# Patient Record
Sex: MALE | Race: ASIAN | NOT HISPANIC OR LATINO | URBAN - METROPOLITAN AREA
[De-identification: names, ages, dates, MRNs, and addresses within clinical notes are randomized per-mention and may not be internally consistent; named-entity substitution may affect disease eponyms.]

---

## 2023-04-26 LAB
ALANINE AMINOTRANSFERASE (SGPT) (U/L) IN SER/PLAS: 27 U/L (ref 10–52)
ALBUMIN (G/DL) IN SER/PLAS: 2.8 G/DL (ref 3.4–5)
ALKALINE PHOSPHATASE (U/L) IN SER/PLAS: 79 U/L (ref 33–136)
ANION GAP IN SER/PLAS: 10 MMOL/L (ref 10–20)
ASPARTATE AMINOTRANSFERASE (SGOT) (U/L) IN SER/PLAS: 32 U/L (ref 9–39)
BILIRUBIN TOTAL (MG/DL) IN SER/PLAS: 0.5 MG/DL (ref 0–1.2)
CALCIUM (MG/DL) IN SER/PLAS: 8.4 MG/DL (ref 8.6–10.6)
CARBON DIOXIDE, TOTAL (MMOL/L) IN SER/PLAS: 26 MMOL/L (ref 21–32)
CHLORIDE (MMOL/L) IN SER/PLAS: 106 MMOL/L (ref 98–107)
CHOLESTEROL (MG/DL) IN SER/PLAS: 220 MG/DL (ref 0–199)
CHOLESTEROL IN HDL (MG/DL) IN SER/PLAS: 64.6 MG/DL
CHOLESTEROL/HDL RATIO: 3.4
CREATININE (MG/DL) IN SER/PLAS: 0.97 MG/DL (ref 0.5–1.3)
ESTIMATED AVERAGE GLUCOSE FOR HBA1C: 146 MG/DL
GFR MALE: 83 ML/MIN/1.73M2
GLUCOSE (MG/DL) IN SER/PLAS: 98 MG/DL (ref 74–99)
HEMOGLOBIN A1C/HEMOGLOBIN TOTAL IN BLOOD: 6.7 %
LDL: 134 MG/DL (ref 0–99)
POTASSIUM (MMOL/L) IN SER/PLAS: 4.2 MMOL/L (ref 3.5–5.3)
PROTEIN TOTAL: 5.4 G/DL (ref 6.4–8.2)
SODIUM (MMOL/L) IN SER/PLAS: 138 MMOL/L (ref 136–145)
TRIGLYCERIDE (MG/DL) IN SER/PLAS: 106 MG/DL (ref 0–149)
UREA NITROGEN (MG/DL) IN SER/PLAS: 16 MG/DL (ref 6–23)
VLDL: 21 MG/DL (ref 0–40)

## 2023-10-04 ENCOUNTER — ANCILLARY PROCEDURE (OUTPATIENT)
Dept: RADIOLOGY | Facility: CLINIC | Age: 73
End: 2023-10-04
Payer: COMMERCIAL

## 2023-10-04 ENCOUNTER — HOSPITAL ENCOUNTER (OUTPATIENT)
Dept: CARDIOLOGY | Facility: CLINIC | Age: 73
Discharge: HOME | End: 2023-10-04
Payer: MEDICARE

## 2023-10-04 ENCOUNTER — ANCILLARY PROCEDURE (OUTPATIENT)
Dept: RADIOLOGY | Facility: CLINIC | Age: 73
End: 2023-10-04
Payer: MEDICARE

## 2023-10-04 DIAGNOSIS — R07.89 OTHER CHEST PAIN: ICD-10-CM

## 2023-10-04 DIAGNOSIS — I25.10 ATHEROSCLEROTIC HEART DISEASE OF NATIVE CORONARY ARTERY WITHOUT ANGINA PECTORIS: Primary | ICD-10-CM

## 2023-10-04 DIAGNOSIS — I25.10 ATHEROSCLEROTIC HEART DISEASE OF NATIVE CORONARY ARTERY WITHOUT ANGINA PECTORIS: ICD-10-CM

## 2023-10-04 DIAGNOSIS — I25.10 ATHEROSCLEROTIC HEART DISEASE: Primary | ICD-10-CM

## 2023-10-04 PROCEDURE — 93016 CV STRESS TEST SUPVJ ONLY: CPT | Performed by: INTERNAL MEDICINE

## 2023-10-04 PROCEDURE — 93017 CV STRESS TEST TRACING ONLY: CPT

## 2023-10-04 PROCEDURE — 78452 HT MUSCLE IMAGE SPECT MULT: CPT | Performed by: INTERNAL MEDICINE

## 2023-10-04 PROCEDURE — 78451 HT MUSCLE IMAGE SPECT SING: CPT

## 2023-10-17 PROBLEM — R76.8 ANA POSITIVE: Status: ACTIVE | Noted: 2023-10-17

## 2023-10-17 PROBLEM — R09.81 CHRONIC NASAL CONGESTION: Status: ACTIVE | Noted: 2023-10-17

## 2023-10-17 PROBLEM — E29.1 HYPOGONADISM MALE: Status: ACTIVE | Noted: 2023-10-17

## 2023-10-17 PROBLEM — I70.90 ARTERIOLOSCLEROSIS: Status: ACTIVE | Noted: 2023-10-17

## 2023-10-17 PROBLEM — E03.8 SUBCLINICAL HYPOTHYROIDISM: Status: ACTIVE | Noted: 2023-10-17

## 2023-10-17 PROBLEM — I73.00 RAYNAUD'S PHENOMENON: Status: ACTIVE | Noted: 2023-10-17

## 2023-10-17 PROBLEM — R39.9 LOWER URINARY TRACT SYMPTOMS (LUTS): Status: ACTIVE | Noted: 2023-10-17

## 2023-10-17 PROBLEM — N52.9 ERECTILE DYSFUNCTION: Status: ACTIVE | Noted: 2023-10-17

## 2023-10-17 PROBLEM — L98.9 SKIN LESION: Status: ACTIVE | Noted: 2023-10-17

## 2023-10-17 PROBLEM — E55.9 VITAMIN D DEFICIENCY: Status: ACTIVE | Noted: 2023-10-17

## 2023-10-17 PROBLEM — K21.9 GERD (GASTROESOPHAGEAL REFLUX DISEASE): Status: ACTIVE | Noted: 2023-10-17

## 2023-10-17 PROBLEM — R22.31 NODULE OF FINGER OF RIGHT HAND: Status: ACTIVE | Noted: 2023-10-17

## 2023-10-17 PROBLEM — E23.7 PITUITARY DISORDER (MULTI): Status: ACTIVE | Noted: 2018-04-18

## 2023-10-17 PROBLEM — R93.1 AGATSTON CAC SCORE, >400: Status: ACTIVE | Noted: 2023-10-17

## 2023-10-17 PROBLEM — I10 HYPERTENSION: Status: ACTIVE | Noted: 2018-04-18

## 2023-10-17 PROBLEM — R31.9 HEMATURIA: Status: ACTIVE | Noted: 2023-10-17

## 2023-10-17 PROBLEM — E11.65 TYPE 2 DIABETES MELLITUS WITH HYPERGLYCEMIA (MULTI): Status: ACTIVE | Noted: 2018-04-18

## 2023-10-17 PROBLEM — E34.9 TESTOSTERONE DEFICIENCY: Status: ACTIVE | Noted: 2018-04-18

## 2023-10-17 PROBLEM — E78.5 HYPERLIPIDEMIA: Status: ACTIVE | Noted: 2018-04-18

## 2023-10-17 PROBLEM — R68.89 COLD INTOLERANCE: Status: ACTIVE | Noted: 2023-10-17

## 2023-10-17 PROBLEM — E11.9 DIABETES MELLITUS TYPE II, CONTROLLED (MULTI): Status: ACTIVE | Noted: 2023-10-17

## 2023-10-17 PROBLEM — E03.9 HYPOTHYROIDISM: Status: ACTIVE | Noted: 2018-04-18

## 2023-10-17 RX ORDER — NIFEDIPINE 30 MG/1
1 TABLET, FILM COATED, EXTENDED RELEASE ORAL DAILY
COMMUNITY
Start: 2020-12-23 | End: 2023-11-21 | Stop reason: WASHOUT

## 2023-10-17 RX ORDER — ROSUVASTATIN CALCIUM 40 MG/1
40 TABLET, COATED ORAL DAILY
COMMUNITY
Start: 2023-08-14 | End: 2024-05-16

## 2023-10-17 RX ORDER — CLINDAMYCIN HYDROCHLORIDE 300 MG/1
CAPSULE ORAL
COMMUNITY
Start: 2023-07-28 | End: 2023-10-19 | Stop reason: ALTCHOICE

## 2023-10-17 RX ORDER — ATORVASTATIN CALCIUM 80 MG/1
80 TABLET, FILM COATED ORAL DAILY
COMMUNITY
End: 2023-11-21 | Stop reason: WASHOUT

## 2023-10-17 RX ORDER — LOSARTAN POTASSIUM 50 MG/1
50 TABLET ORAL DAILY
COMMUNITY
Start: 2018-03-14 | End: 2023-11-21 | Stop reason: WASHOUT

## 2023-10-17 RX ORDER — METFORMIN HYDROCHLORIDE 500 MG/1
500 TABLET, EXTENDED RELEASE ORAL DAILY
COMMUNITY
End: 2024-03-14 | Stop reason: WASHOUT

## 2023-10-17 RX ORDER — LOSARTAN POTASSIUM 100 MG/1
100 TABLET ORAL DAILY
COMMUNITY
End: 2024-05-23

## 2023-10-17 RX ORDER — ATORVASTATIN CALCIUM 20 MG/1
20 TABLET, FILM COATED ORAL DAILY
COMMUNITY
Start: 2018-03-14 | End: 2023-11-21 | Stop reason: WASHOUT

## 2023-10-17 RX ORDER — EZETIMIBE 10 MG/1
10 TABLET ORAL DAILY
COMMUNITY
Start: 2023-09-06

## 2023-10-17 RX ORDER — ASPIRIN 81 MG/1
TABLET ORAL
COMMUNITY
Start: 2010-12-11

## 2023-10-17 RX ORDER — TAMSULOSIN HYDROCHLORIDE 0.4 MG/1
0.4 CAPSULE ORAL DAILY
COMMUNITY
Start: 2023-05-24 | End: 2023-11-21 | Stop reason: WASHOUT

## 2023-10-17 RX ORDER — FLUTICASONE PROPIONATE 50 MCG
1 SPRAY, SUSPENSION (ML) NASAL AS NEEDED
COMMUNITY
Start: 2022-01-04

## 2023-10-19 ENCOUNTER — OFFICE VISIT (OUTPATIENT)
Dept: DERMATOLOGY | Facility: CLINIC | Age: 73
End: 2023-10-19
Payer: COMMERCIAL

## 2023-10-19 DIAGNOSIS — L82.1 SEBORRHEIC KERATOSIS: ICD-10-CM

## 2023-10-19 DIAGNOSIS — L81.1 MELASMA: Primary | ICD-10-CM

## 2023-10-19 PROCEDURE — 4010F ACE/ARB THERAPY RXD/TAKEN: CPT | Performed by: DERMATOLOGY

## 2023-10-19 PROCEDURE — 1036F TOBACCO NON-USER: CPT | Performed by: DERMATOLOGY

## 2023-10-19 PROCEDURE — 1159F MED LIST DOCD IN RCRD: CPT | Performed by: DERMATOLOGY

## 2023-10-19 PROCEDURE — 3044F HG A1C LEVEL LT 7.0%: CPT | Performed by: DERMATOLOGY

## 2023-10-19 PROCEDURE — 1126F AMNT PAIN NOTED NONE PRSNT: CPT | Performed by: DERMATOLOGY

## 2023-10-19 PROCEDURE — 99204 OFFICE O/P NEW MOD 45 MIN: CPT | Performed by: DERMATOLOGY

## 2023-10-19 ASSESSMENT — DERMATOLOGY PATIENT ASSESSMENT
ARE YOU AN ORGAN TRANSPLANT RECIPIENT: NO
DO YOU USE A TANNING BED: NO
DO YOU HAVE ANY NEW OR CHANGING LESIONS: YES

## 2023-10-19 ASSESSMENT — DERMATOLOGY QUALITY OF LIFE (QOL) ASSESSMENT
RATE HOW EMOTIONALLY BOTHERED YOU ARE BY YOUR SKIN PROBLEM (FOR EXAMPLE, WORRY, EMBARRASSMENT, FRUSTRATION): 2
WHAT SINGLE SKIN CONDITION LISTED BELOW IS THE PATIENT ANSWERING THE QUALITY-OF-LIFE ASSESSMENT QUESTIONS ABOUT: NONE OF THE ABOVE
RATE HOW BOTHERED YOU ARE BY SYMPTOMS OF YOUR SKIN PROBLEM (EG, ITCHING, STINGING BURNING, HURTING OR SKIN IRRITATION): 2
RATE HOW BOTHERED YOU ARE BY EFFECTS OF YOUR SKIN PROBLEMS ON YOUR ACTIVITIES (EG, GOING OUT, ACCOMPLISHING WHAT YOU WANT, WORK ACTIVITIES OR YOUR RELATIONSHIPS WITH OTHERS): 0 - NEVER BOTHERED
ARE THERE EXCLUSIONS OR EXCEPTIONS FOR THE QUALITY OF LIFE ASSESSMENT: NO

## 2023-10-19 ASSESSMENT — ITCH NUMERIC RATING SCALE: HOW SEVERE IS YOUR ITCHING?: 3

## 2023-10-19 ASSESSMENT — PATIENT GLOBAL ASSESSMENT (PGA): PATIENT GLOBAL ASSESSMENT: PATIENT GLOBAL ASSESSMENT:  2 - MILD

## 2023-10-19 NOTE — PROGRESS NOTES
Subjective     Mani Larios is a 73 y.o. male who presents for the following: Suspicious Skin Lesion (Pt here today for lesion on right hand, and right cheek. Lesion on hand is enlarging and gets dry, itchy, and will crack. ).     Review of Systems:  No other skin or systemic complaints other than what is documented elsewhere in the note.    The following portions of the chart were reviewed this encounter and updated as appropriate:          Skin Cancer History  No skin cancer on file.      Specialty Problems          Dermatology Problems    Skin lesion        Objective   Well appearing patient in no apparent distress; mood and affect are within normal limits.    A focused skin examination was performed. All findings within normal limits unless otherwise noted below.    Assessment/Plan   1. Melasma  Left Buccal Cheek, Right Buccal Cheek  Hyperpigmented coalesced patches    Melasma is a common skin problem caused by brown to gray-brown patches on the face. Most people get it on their cheeks, chin, nose bridge, forehead, and above the upper lip. It is more common in women than men. Pregnancy is a common cause of melasma. It also affects woman taking oral contraceptives and hormones.    Start compounded kojic acid/hydroquinone/tretinoin 0.05% cream - pea sized amount every other day, increase to daily to as tolerated in evening. 2 months on 2 months off.    Side effects of hydroquinone reviewed including exogenous ochronosis.    Rx faxed to [facility] and patient given contact information.    Enouraged use of tinted sunscreen as studies show even light from screens can worsen or induce pigmentation. Samples of cerave tinted sunscreen and la roche posay tinted sunscreen provided.    2. Seborrheic keratosis  Right Dorsal Hand  Dark brown lichenified stuck on plaque    The benign nature of these skin lesions reviewed, reassure provided and no further treatment needed at this time.   These lesions can be removed,  if symptomatic (itching, bleeding, rubbing on clothing, painful), otherwise removal is considered cosmetic.       Follow up in 1 year or sooner if needed.

## 2023-11-15 PROBLEM — I25.10 CAD (CORONARY ARTERY DISEASE): Chronic | Status: ACTIVE | Noted: 2023-10-17

## 2023-11-16 ENCOUNTER — LAB (OUTPATIENT)
Dept: LAB | Facility: LAB | Age: 73
End: 2023-11-16
Payer: MEDICARE

## 2023-11-16 DIAGNOSIS — I25.10 ATHEROSCLEROTIC HEART DISEASE OF NATIVE CORONARY ARTERY WITHOUT ANGINA PECTORIS: Primary | ICD-10-CM

## 2023-11-16 DIAGNOSIS — R07.89 OTHER CHEST PAIN: ICD-10-CM

## 2023-11-16 LAB
ALBUMIN SERPL BCP-MCNC: 3 G/DL (ref 3.4–5)
ALP SERPL-CCNC: 64 U/L (ref 33–136)
ALT SERPL W P-5'-P-CCNC: 34 U/L (ref 10–52)
ANION GAP SERPL CALC-SCNC: 8 MMOL/L (ref 10–20)
AST SERPL W P-5'-P-CCNC: 32 U/L (ref 9–39)
BILIRUB SERPL-MCNC: 0.5 MG/DL (ref 0–1.2)
BUN SERPL-MCNC: 18 MG/DL (ref 6–23)
CALCIUM SERPL-MCNC: 8.7 MG/DL (ref 8.6–10.3)
CHLORIDE SERPL-SCNC: 106 MMOL/L (ref 98–107)
CHOLEST SERPL-MCNC: 157 MG/DL (ref 0–199)
CHOLESTEROL/HDL RATIO: 2.4
CO2 SERPL-SCNC: 29 MMOL/L (ref 21–32)
CREAT SERPL-MCNC: 0.99 MG/DL (ref 0.5–1.3)
GFR SERPL CREATININE-BSD FRML MDRD: 81 ML/MIN/1.73M*2
GLUCOSE SERPL-MCNC: 102 MG/DL (ref 74–99)
HDLC SERPL-MCNC: 65.3 MG/DL
LDLC SERPL CALC-MCNC: 74 MG/DL
NON HDL CHOLESTEROL: 92 MG/DL (ref 0–149)
POTASSIUM SERPL-SCNC: 4.3 MMOL/L (ref 3.5–5.3)
PROT SERPL-MCNC: 5.4 G/DL (ref 6.4–8.2)
SODIUM SERPL-SCNC: 139 MMOL/L (ref 136–145)
TRIGL SERPL-MCNC: 88 MG/DL (ref 0–149)
VLDL: 18 MG/DL (ref 0–40)

## 2023-11-16 PROCEDURE — 36415 COLL VENOUS BLD VENIPUNCTURE: CPT

## 2023-11-16 PROCEDURE — 80061 LIPID PANEL: CPT

## 2023-11-16 PROCEDURE — 80053 COMPREHEN METABOLIC PANEL: CPT

## 2023-11-21 ENCOUNTER — OFFICE VISIT (OUTPATIENT)
Dept: CARDIOLOGY | Facility: CLINIC | Age: 73
End: 2023-11-21
Payer: MEDICARE

## 2023-11-21 VITALS
HEART RATE: 74 BPM | DIASTOLIC BLOOD PRESSURE: 78 MMHG | WEIGHT: 142 LBS | SYSTOLIC BLOOD PRESSURE: 126 MMHG | OXYGEN SATURATION: 98 % | BODY MASS INDEX: 22.92 KG/M2

## 2023-11-21 DIAGNOSIS — I10 PRIMARY HYPERTENSION: Primary | ICD-10-CM

## 2023-11-21 DIAGNOSIS — E78.2 MIXED HYPERLIPIDEMIA: Chronic | ICD-10-CM

## 2023-11-21 DIAGNOSIS — I25.10 CORONARY ARTERY DISEASE INVOLVING NATIVE CORONARY ARTERY OF NATIVE HEART WITHOUT ANGINA PECTORIS: Chronic | ICD-10-CM

## 2023-11-21 PROBLEM — E03.8 SUBCLINICAL HYPOTHYROIDISM: Status: RESOLVED | Noted: 2023-10-17 | Resolved: 2023-11-21

## 2023-11-21 PROBLEM — R09.81 CHRONIC NASAL CONGESTION: Status: RESOLVED | Noted: 2023-10-17 | Resolved: 2023-11-21

## 2023-11-21 PROBLEM — E78.5 HYPERLIPIDEMIA: Chronic | Status: ACTIVE | Noted: 2018-04-18

## 2023-11-21 PROBLEM — R68.89 COLD INTOLERANCE: Status: RESOLVED | Noted: 2023-10-17 | Resolved: 2023-11-21

## 2023-11-21 PROBLEM — E29.1 HYPOGONADISM MALE: Status: RESOLVED | Noted: 2023-10-17 | Resolved: 2023-11-21

## 2023-11-21 PROBLEM — R39.9 LOWER URINARY TRACT SYMPTOMS (LUTS): Status: RESOLVED | Noted: 2023-10-17 | Resolved: 2023-11-21

## 2023-11-21 PROBLEM — L98.9 SKIN LESION: Status: RESOLVED | Noted: 2023-10-17 | Resolved: 2023-11-21

## 2023-11-21 PROBLEM — R93.1 AGATSTON CAC SCORE, >400: Status: RESOLVED | Noted: 2023-10-17 | Resolved: 2023-11-21

## 2023-11-21 PROBLEM — K21.9 GERD (GASTROESOPHAGEAL REFLUX DISEASE): Status: RESOLVED | Noted: 2023-10-17 | Resolved: 2023-11-21

## 2023-11-21 PROBLEM — R31.9 HEMATURIA: Status: RESOLVED | Noted: 2023-10-17 | Resolved: 2023-11-21

## 2023-11-21 PROBLEM — E11.65 TYPE 2 DIABETES MELLITUS WITH HYPERGLYCEMIA (MULTI): Chronic | Status: ACTIVE | Noted: 2018-04-18

## 2023-11-21 PROCEDURE — 3078F DIAST BP <80 MM HG: CPT | Performed by: INTERNAL MEDICINE

## 2023-11-21 PROCEDURE — 1160F RVW MEDS BY RX/DR IN RCRD: CPT | Performed by: INTERNAL MEDICINE

## 2023-11-21 PROCEDURE — 99214 OFFICE O/P EST MOD 30 MIN: CPT | Performed by: INTERNAL MEDICINE

## 2023-11-21 PROCEDURE — 1159F MED LIST DOCD IN RCRD: CPT | Performed by: INTERNAL MEDICINE

## 2023-11-21 PROCEDURE — 3048F LDL-C <100 MG/DL: CPT | Performed by: INTERNAL MEDICINE

## 2023-11-21 PROCEDURE — 3074F SYST BP LT 130 MM HG: CPT | Performed by: INTERNAL MEDICINE

## 2023-11-21 PROCEDURE — 1036F TOBACCO NON-USER: CPT | Performed by: INTERNAL MEDICINE

## 2023-11-21 PROCEDURE — 3044F HG A1C LEVEL LT 7.0%: CPT | Performed by: INTERNAL MEDICINE

## 2023-11-21 PROCEDURE — 4010F ACE/ARB THERAPY RXD/TAKEN: CPT | Performed by: INTERNAL MEDICINE

## 2023-11-21 PROCEDURE — 1126F AMNT PAIN NOTED NONE PRSNT: CPT | Performed by: INTERNAL MEDICINE

## 2023-11-21 NOTE — PROGRESS NOTES
Referred by No ref. provider found    HPI Feeling better. Chest heaviness has resolved. He thinks he is just used to taking the rosuv.     Past Medical History:  Problem List Items Addressed This Visit    None       Past Medical History:   Diagnosis Date    CAD (coronary artery disease) 10/17/2023    Elevated  Agatston units    Cough, unspecified 09/20/2016    Cough    Generalized abdominal pain 07/13/2018    Diffuse abdominal pain    Hypo-osmolality and hyponatremia 11/11/2016    Hyponatremia    Myalgia, unspecified site 09/20/2016    Myalgia    Other specified disorders of muscle 07/13/2018    Decreased muscle tone    Pain in left forearm 12/26/2017    Pain of left forearm    Personal history of diseases of the blood and blood-forming organs and certain disorders involving the immune mechanism 11/03/2020    History of anemia    Personal history of other diseases of the nervous system and sense organs 07/13/2018    History of peripheral neuropathy    Personal history of other diseases of the respiratory system 02/07/2019    History of upper respiratory infection    Personal history of other specified conditions 10/20/2020    History of multiple pulmonary nodules    Personal history of other specified conditions 07/13/2018    History of chronic cough    Personal history of other specified conditions 12/26/2017    History of night sweats    Thoracic aortic aneurysm, without rupture, unspecified (CMS/HCC) 01/04/2022    Thoracic aortic aneurysm (TAA)             Past Surgical History:  He has no past surgical history on file.      Social History:  He reports that he has never smoked. He has never used smokeless tobacco. Alcohol use questions deferred to the physician. Drug use questions deferred to the physician.    Family History:  No family history on file.     Allergies:  Penicillins    Outpatient Medications:  Current Outpatient Medications   Medication Instructions    aspirin (Adult Low Dose Aspirin) 81 mg  EC tablet oral    atorvastatin (LIPITOR) 20 mg, oral, Daily    atorvastatin (LIPITOR) 80 mg, oral, Daily    ezetimibe (ZETIA) 10 mg, oral, Daily    fluticasone (Flonase) 50 mcg/actuation nasal spray 1 spray, Each Nostril, Daily    losartan (COZAAR) 50 mg, oral, Daily    losartan (COZAAR) 100 mg, oral, Daily    metFORMIN XR (GLUCOPHAGE-XR) 500 mg, oral, Daily    NIFEdipine ER (NIFEdipine CC) 30 mg 24 hr tablet 1 tablet, oral, Daily    rosuvastatin (CRESTOR) 40 mg, oral, Daily    tamsulosin (FLOMAX) 0.4 mg, oral, Daily        Last Recorded Vitals:  There were no vitals filed for this visit.    Physical Exam    Physical  Patient is alert and oriented x3.  HEENT is unremarkable mucous members are moist  Neck no JVP no bruits upstrokes are full no thyromegaly  Lungs are clear bilaterally.  No wheezing crackles or rales  Heart regular rhythm normal S1-S2 there is no S3 no murmurs are heard.  Abdomen is soft vessels are positive nontender nondistended no organomegaly no pulsatile masses  Extremities have no edema.  Distal pulses present palpable.  Neuro is grossly nonfocal  Skin has no rashes     Last Labs:  CBC -  Lab Results   Component Value Date    WBC 7.1 01/19/2022    HGB 14.4 01/19/2022    HCT 44.8 01/19/2022    MCV 89 01/19/2022     01/19/2022       CMP -  Lab Results   Component Value Date    CALCIUM 8.7 11/16/2023    PROT 5.4 (L) 11/16/2023    ALBUMIN 3.0 (L) 11/16/2023    AST 32 11/16/2023    ALT 34 11/16/2023    ALKPHOS 64 11/16/2023    BILITOT 0.5 11/16/2023       LIPID PANEL -   Lab Results   Component Value Date    CHOL 157 11/16/2023    HDL 65.3 11/16/2023    CHHDL 2.4 11/16/2023    VLDL 18 11/16/2023    TRIG 88 11/16/2023    NHDL 92 11/16/2023       RENAL FUNCTION PANEL -   Lab Results   Component Value Date    K 4.3 11/16/2023       Lab Results   Component Value Date    HGBA1C 6.7 (A) 04/26/2023     Procedure  TST 10/4/23 Neg EKG, Avg cap, Freq PVC's, NL nuc EF 69%     STRESS ECHO [11/04/2020]: 7m  1s (8.50 METs) = Normal ST segment response to moderate peak WL max ETT. Intermittent PVCs w/ventri doublets w/exercise. Normal exercise echo findings.     CT / SCORING [10/12/2020] = 588.23 (LM 0, .67, LCx 111.01, .55). Mildly dilated ascending aorta (3.8 cm). There is moderate calcified atheroma in the descending thoracic aorta. There is an 8.7 mm partially calcified pulmonary nodule in the right lower lobe and a 2.8 mm noncalcified pulmonary nodule in the right upper lobe.     Left Heart Cath [01/05/1999, Dr. Patricia Mclean-Jennifer @ [place]]: Mild CAD. Normal LVF. LVEF 55-60%.          Assessment/Plan   1. CAD. Elevated calcium score 588 units.  The chest heaviness that he had talked to me about in September 2023 has resolved.  He believes it was due to starting rosuvastatin.  He is physically active he is walking regularly he feels well there is no recurrent chest pain.  Stress test was reviewed.  He had average capacity for age no EKG changes no symptoms with exercise and nuclear imaging was normal.  Continue with risk factor modification including aspirin and rosuvastatin     2. Hyperlipidemia. LDL 74,  HDL 65 Trig 88 11/16 23.  Patient was on rosuvastatin 40 and Zetia 10.  With atorvastatin 80 his LDL was up to 137.     3. Hypertension.  Well-controlled at this time     From a cardiac standpoint he is doing well.  He has evidence of atherosclerosis but no evidence of severe obstructive disease.  Continue aggressive risk factor modification and lifestyle changes with continuous exercise and watching his diet.  My plan will be to see him back in 1 year.  I will have him follow-up with his primary care doctor for routine blood work which should be done in approximately 1 year to reassess his cholesterol.    Matthew David MD     Instructions and follow up

## 2023-11-21 NOTE — PATIENT INSTRUCTIONS
1. CAD. Elevated calcium score 588 units.  The chest heaviness that he had talked to me about in September 2023 has resolved.  He believes it was due to starting rosuvastatin.  He is physically active he is walking regularly he feels well there is no recurrent chest pain.  Stress test was reviewed.  He had average capacity for age no EKG changes no symptoms with exercise and nuclear imaging was normal.  Continue with risk factor modification including aspirin and rosuvastatin     2. Hyperlipidemia. LDL 74,  HDL 65 Trig 88 11/16 23.  Patient was on rosuvastatin 40 and Zetia 10.  With atorvastatin 80 his LDL was up to 137.     3. Hypertension.  Well-controlled at this time     From a cardiac standpoint he is doing well.  He has evidence of atherosclerosis but no evidence of severe obstructive disease.  Continue aggressive risk factor modification and lifestyle changes with continuous exercise and watching his diet.  My plan will be to see him back in 1 year.  I will have him follow-up with his primary care doctor for routine blood work which should be done in approximately 1 year to reassess his cholesterol.

## 2023-11-30 ENCOUNTER — APPOINTMENT (OUTPATIENT)
Dept: UROLOGY | Facility: HOSPITAL | Age: 73
End: 2023-11-30
Payer: COMMERCIAL

## 2024-02-29 ENCOUNTER — APPOINTMENT (OUTPATIENT)
Dept: UROLOGY | Facility: HOSPITAL | Age: 74
End: 2024-02-29
Payer: MEDICARE

## 2024-03-14 ENCOUNTER — OFFICE VISIT (OUTPATIENT)
Dept: PRIMARY CARE | Facility: CLINIC | Age: 74
End: 2024-03-14
Payer: MEDICARE

## 2024-03-14 VITALS
SYSTOLIC BLOOD PRESSURE: 145 MMHG | HEIGHT: 66 IN | BODY MASS INDEX: 22.18 KG/M2 | DIASTOLIC BLOOD PRESSURE: 87 MMHG | OXYGEN SATURATION: 97 % | TEMPERATURE: 97.1 F | WEIGHT: 138 LBS | HEART RATE: 79 BPM

## 2024-03-14 DIAGNOSIS — E03.8 SUBCLINICAL HYPOTHYROIDISM: ICD-10-CM

## 2024-03-14 DIAGNOSIS — R30.0 DYSURIA: Primary | ICD-10-CM

## 2024-03-14 DIAGNOSIS — E11.65 TYPE 2 DIABETES MELLITUS WITH HYPERGLYCEMIA, WITHOUT LONG-TERM CURRENT USE OF INSULIN (MULTI): ICD-10-CM

## 2024-03-14 DIAGNOSIS — Z12.5 SCREENING FOR PROSTATE CANCER: ICD-10-CM

## 2024-03-14 DIAGNOSIS — E11.9 TYPE 2 DIABETES MELLITUS WITHOUT COMPLICATION, WITHOUT LONG-TERM CURRENT USE OF INSULIN (MULTI): ICD-10-CM

## 2024-03-14 PROBLEM — E23.7 PITUITARY DISORDER (MULTI): Status: RESOLVED | Noted: 2018-04-18 | Resolved: 2024-03-14

## 2024-03-14 LAB
POC APPEARANCE, URINE: CLEAR
POC BILIRUBIN, URINE: NEGATIVE
POC BLOOD, URINE: ABNORMAL
POC COLOR, URINE: ABNORMAL
POC GLUCOSE, URINE: NEGATIVE MG/DL
POC KETONES, URINE: NEGATIVE MG/DL
POC LEUKOCYTES, URINE: NEGATIVE
POC NITRITE,URINE: NEGATIVE
POC PH, URINE: 5.5 PH
POC PROTEIN, URINE: ABNORMAL MG/DL
POC SPECIFIC GRAVITY, URINE: 1.02
POC UROBILINOGEN, URINE: 0.2 EU/DL

## 2024-03-14 PROCEDURE — 3079F DIAST BP 80-89 MM HG: CPT | Performed by: INTERNAL MEDICINE

## 2024-03-14 PROCEDURE — 4010F ACE/ARB THERAPY RXD/TAKEN: CPT | Performed by: INTERNAL MEDICINE

## 2024-03-14 PROCEDURE — 1036F TOBACCO NON-USER: CPT | Performed by: INTERNAL MEDICINE

## 2024-03-14 PROCEDURE — 99214 OFFICE O/P EST MOD 30 MIN: CPT | Performed by: INTERNAL MEDICINE

## 2024-03-14 PROCEDURE — 3077F SYST BP >= 140 MM HG: CPT | Performed by: INTERNAL MEDICINE

## 2024-03-14 PROCEDURE — 81003 URINALYSIS AUTO W/O SCOPE: CPT | Performed by: INTERNAL MEDICINE

## 2024-03-14 PROCEDURE — 1159F MED LIST DOCD IN RCRD: CPT | Performed by: INTERNAL MEDICINE

## 2024-03-14 PROCEDURE — G2211 COMPLEX E/M VISIT ADD ON: HCPCS | Performed by: INTERNAL MEDICINE

## 2024-03-14 RX ORDER — NIFEDIPINE 30 MG/1
30 TABLET, FILM COATED, EXTENDED RELEASE ORAL
COMMUNITY

## 2024-03-14 RX ORDER — BLOOD-GLUCOSE SENSOR
EACH MISCELLANEOUS
Qty: 6 EACH | Refills: 1 | Status: SHIPPED | OUTPATIENT
Start: 2024-03-14

## 2024-03-14 RX ORDER — BLOOD-GLUCOSE,RECEIVER,CONT
EACH MISCELLANEOUS
Qty: 1 EACH | Refills: 0 | Status: SHIPPED | OUTPATIENT
Start: 2024-03-14

## 2024-03-14 ASSESSMENT — PATIENT HEALTH QUESTIONNAIRE - PHQ9
1. LITTLE INTEREST OR PLEASURE IN DOING THINGS: NOT AT ALL
2. FEELING DOWN, DEPRESSED OR HOPELESS: NOT AT ALL
SUM OF ALL RESPONSES TO PHQ9 QUESTIONS 1 & 2: 0

## 2024-03-14 NOTE — PROGRESS NOTES
"Subjective   Patient ID: Mani Larios is a 73 y.o. male who presents for UTI and follow up.    HPI   Not seen in almost 1 year.    4-5 days ago noticed some dysuria which has since resolved. No fevers.    Saw Cardiology, added zetia. Tolerating this and his statin.    Has had some episodes of feeling weak, sweating, thinking unclear and fatigued and checks sugars in the 50s.  Optho UTD    Bps at home ~130s/90  Not taking nifedipine.    Review of Systems    Objective   /87   Pulse 79   Temp 36.2 °C (97.1 °F)   Ht 1.676 m (5' 6\")   Wt 62.6 kg (138 lb)   SpO2 97%   BMI 22.27 kg/m²     Physical Exam  Constitutional:       Appearance: Normal appearance.   Cardiovascular:      Rate and Rhythm: Normal rate and regular rhythm.      Heart sounds: No murmur heard.  Pulmonary:      Effort: Pulmonary effort is normal.      Breath sounds: Normal breath sounds.   Musculoskeletal:      Right lower leg: No edema.      Left lower leg: No edema.   Neurological:      General: No focal deficit present.      Mental Status: He is alert.      Gait: Gait normal.         Assessment/Plan   Problem List Items Addressed This Visit             ICD-10-CM    Subclinical hypothyroidism E03.8    Relevant Orders    TSH with reflex to Free T4 if abnormal    Type 2 diabetes mellitus without complication, without long-term current use of insulin (CMS/[facility]) E11.9    Relevant Medications    FreeStyle Javon 3 Scio misc    FreeStyle Javon 3 Sensor device    Other Relevant Orders    Hemoglobin A1C    Albumin , Urine Random    Dysuria - Primary R30.0    Relevant Orders    POCT UA Automated manually resulted (Completed)    CBC    Type 2 diabetes mellitus with hyperglycemia, without long-term current use of insulin (CMS/HCC) E11.65     Other Visit Diagnoses         Codes    Screening for prostate cancer     Z12.5    Relevant Orders    Prostate Specific Antigen, Screen             Diabetes   -Stop metformin given hypos  -Optho - UTD     HTN   -Cont " losartan  -Restart nifedipine     Low testosterone  -Previously following with Dr Kuhn but not being treated, will monitor     HLP  -Continue statin and zetia     Raynauds  -Restart nifedipine     Chronic nasal congestion  -Flonase     CAC score 588 in 2020  -Continue statin  -Seeing Cardiology    LUTS, microscopic hematuria - Seeing Urology. Neg hematuria workup.     Health maintenance  -Had colonoscopy 8/2021, due in 10 years  -PSA nml 1/2022. Sees Urology.  -Immunizations    -Pneumonia - UTD with both     f/u 4 months

## 2024-03-15 ENCOUNTER — LAB (OUTPATIENT)
Dept: LAB | Facility: LAB | Age: 74
End: 2024-03-15
Payer: MEDICARE

## 2024-03-15 DIAGNOSIS — E11.9 TYPE 2 DIABETES MELLITUS WITHOUT COMPLICATION, WITHOUT LONG-TERM CURRENT USE OF INSULIN (MULTI): ICD-10-CM

## 2024-03-15 DIAGNOSIS — Z12.5 SCREENING FOR PROSTATE CANCER: ICD-10-CM

## 2024-03-15 DIAGNOSIS — E03.8 SUBCLINICAL HYPOTHYROIDISM: ICD-10-CM

## 2024-03-15 DIAGNOSIS — R30.0 DYSURIA: ICD-10-CM

## 2024-03-15 LAB
CREAT UR-MCNC: 41.5 MG/DL (ref 20–370)
ERYTHROCYTE [DISTWIDTH] IN BLOOD BY AUTOMATED COUNT: 11.9 % (ref 11.5–14.5)
EST. AVERAGE GLUCOSE BLD GHB EST-MCNC: 140 MG/DL
HBA1C MFR BLD: 6.5 %
HCT VFR BLD AUTO: 42 % (ref 41–52)
HGB BLD-MCNC: 13.4 G/DL (ref 13.5–17.5)
MCH RBC QN AUTO: 27.7 PG (ref 26–34)
MCHC RBC AUTO-ENTMCNC: 31.9 G/DL (ref 32–36)
MCV RBC AUTO: 87 FL (ref 80–100)
MICROALBUMIN UR-MCNC: 764.8 MG/L
MICROALBUMIN/CREAT UR: 1842.9 UG/MG CREAT
NRBC BLD-RTO: 0 /100 WBCS (ref 0–0)
PLATELET # BLD AUTO: 232 X10*3/UL (ref 150–450)
PSA SERPL-MCNC: 3.99 NG/ML
RBC # BLD AUTO: 4.84 X10*6/UL (ref 4.5–5.9)
T4 FREE SERPL-MCNC: 0.93 NG/DL (ref 0.78–1.48)
TSH SERPL-ACNC: 6.18 MIU/L (ref 0.44–3.98)
WBC # BLD AUTO: 8.1 X10*3/UL (ref 4.4–11.3)

## 2024-03-15 PROCEDURE — 85027 COMPLETE CBC AUTOMATED: CPT

## 2024-03-15 PROCEDURE — 84439 ASSAY OF FREE THYROXINE: CPT

## 2024-03-15 PROCEDURE — 82570 ASSAY OF URINE CREATININE: CPT

## 2024-03-15 PROCEDURE — 83036 HEMOGLOBIN GLYCOSYLATED A1C: CPT

## 2024-03-15 PROCEDURE — 82043 UR ALBUMIN QUANTITATIVE: CPT

## 2024-03-15 PROCEDURE — G0103 PSA SCREENING: HCPCS

## 2024-03-15 PROCEDURE — 84443 ASSAY THYROID STIM HORMONE: CPT

## 2024-03-15 PROCEDURE — 36415 COLL VENOUS BLD VENIPUNCTURE: CPT

## 2024-03-20 DIAGNOSIS — D64.9 ANEMIA, UNSPECIFIED TYPE: Primary | ICD-10-CM

## 2024-05-06 ENCOUNTER — TELEPHONE (OUTPATIENT)
Dept: PRIMARY CARE | Facility: CLINIC | Age: 74
End: 2024-05-06
Payer: MEDICARE

## 2024-05-06 NOTE — TELEPHONE ENCOUNTER
Pt called and said he is going to be traveling, and it was recommended he get a vaccine for Meningitis. Specifically the Meningococcal quadrivalent [id number]. I'm not sure why he needs a script, because I'm pretty sure he can just walk in and get this vaccine, but I wanted to put in the request for him anyway.

## 2024-05-06 NOTE — TELEPHONE ENCOUNTER
I recommend he make an appointment at a travel clinic, such as [facility], which has locations all over.  They can give vaccines there.

## 2024-05-07 ENCOUNTER — E-VISIT (OUTPATIENT)
Dept: PRIMARY CARE | Facility: CLINIC | Age: 74
End: 2024-05-07
Payer: MEDICARE

## 2024-05-07 DIAGNOSIS — Z71.84 TRAVEL ADVICE ENCOUNTER: Primary | ICD-10-CM

## 2024-05-07 PROCEDURE — 99422 OL DIG E/M SVC 11-20 MIN: CPT | Performed by: INTERNAL MEDICINE

## 2024-05-16 DIAGNOSIS — E78.2 MIXED HYPERLIPIDEMIA: Primary | Chronic | ICD-10-CM

## 2024-05-16 RX ORDER — ROSUVASTATIN CALCIUM 40 MG/1
40 TABLET, COATED ORAL DAILY
Qty: 90 TABLET | Refills: 3 | Status: SHIPPED | OUTPATIENT
Start: 2024-05-16

## 2024-05-23 DIAGNOSIS — I10 PRIMARY HYPERTENSION: Primary | Chronic | ICD-10-CM

## 2024-05-23 RX ORDER — LOSARTAN POTASSIUM 100 MG/1
100 TABLET ORAL DAILY
Qty: 90 TABLET | Refills: 1 | Status: SHIPPED | OUTPATIENT
Start: 2024-05-23

## 2024-07-18 ENCOUNTER — APPOINTMENT (OUTPATIENT)
Dept: PRIMARY CARE | Facility: CLINIC | Age: 74
End: 2024-07-18
Payer: MEDICARE

## 2024-08-28 RX ORDER — METFORMIN HYDROCHLORIDE 500 MG/1
500 TABLET, EXTENDED RELEASE ORAL
OUTPATIENT
Start: 2024-08-28

## 2024-08-28 RX ORDER — METFORMIN HYDROCHLORIDE 500 MG/1
500 TABLET, EXTENDED RELEASE ORAL DAILY
Qty: 90 TABLET | Refills: 0 | OUTPATIENT
Start: 2024-08-28

## 2024-08-28 RX ORDER — METFORMIN HYDROCHLORIDE 500 MG/1
1 TABLET, EXTENDED RELEASE ORAL
COMMUNITY
Start: 2024-05-30

## 2024-11-18 DIAGNOSIS — I10 PRIMARY HYPERTENSION: Chronic | ICD-10-CM

## 2024-11-18 RX ORDER — LOSARTAN POTASSIUM 100 MG/1
100 TABLET ORAL DAILY
Qty: 90 TABLET | Refills: 1 | Status: SHIPPED | OUTPATIENT
Start: 2024-11-18

## 2024-12-05 ENCOUNTER — APPOINTMENT (OUTPATIENT)
Dept: PRIMARY CARE | Facility: CLINIC | Age: 74
End: 2024-12-05
Payer: MEDICARE

## 2024-12-05 VITALS
HEART RATE: 87 BPM | WEIGHT: 141 LBS | OXYGEN SATURATION: 92 % | HEIGHT: 66 IN | SYSTOLIC BLOOD PRESSURE: 164 MMHG | DIASTOLIC BLOOD PRESSURE: 90 MMHG | BODY MASS INDEX: 22.66 KG/M2

## 2024-12-05 DIAGNOSIS — Z00.00 MEDICARE ANNUAL WELLNESS VISIT, SUBSEQUENT: ICD-10-CM

## 2024-12-05 DIAGNOSIS — M25.561 CHRONIC PAIN OF RIGHT KNEE: ICD-10-CM

## 2024-12-05 DIAGNOSIS — I49.9 IRREGULAR HEART BEAT: ICD-10-CM

## 2024-12-05 DIAGNOSIS — G89.29 CHRONIC RIGHT HIP PAIN: ICD-10-CM

## 2024-12-05 DIAGNOSIS — Z00.00 ROUTINE GENERAL MEDICAL EXAMINATION AT HEALTH CARE FACILITY: Primary | ICD-10-CM

## 2024-12-05 DIAGNOSIS — M25.551 CHRONIC RIGHT HIP PAIN: ICD-10-CM

## 2024-12-05 DIAGNOSIS — R00.2 HEART PALPITATIONS: ICD-10-CM

## 2024-12-05 DIAGNOSIS — I10 PRIMARY HYPERTENSION: Chronic | ICD-10-CM

## 2024-12-05 DIAGNOSIS — R51.9 NEW ONSET OF HEADACHES AFTER AGE 50: ICD-10-CM

## 2024-12-05 DIAGNOSIS — G89.29 CHRONIC PAIN OF RIGHT KNEE: ICD-10-CM

## 2024-12-05 DIAGNOSIS — E11.9 TYPE 2 DIABETES MELLITUS WITHOUT COMPLICATION, WITHOUT LONG-TERM CURRENT USE OF INSULIN (MULTI): ICD-10-CM

## 2024-12-05 PROBLEM — E11.65 TYPE 2 DIABETES MELLITUS WITH HYPERGLYCEMIA, WITHOUT LONG-TERM CURRENT USE OF INSULIN: Status: RESOLVED | Noted: 2024-03-14 | Resolved: 2024-12-05

## 2024-12-05 PROBLEM — R30.0 DYSURIA: Status: RESOLVED | Noted: 2024-03-14 | Resolved: 2024-12-05

## 2024-12-05 PROCEDURE — 1170F FXNL STATUS ASSESSED: CPT | Performed by: INTERNAL MEDICINE

## 2024-12-05 PROCEDURE — 4010F ACE/ARB THERAPY RXD/TAKEN: CPT | Performed by: INTERNAL MEDICINE

## 2024-12-05 PROCEDURE — 3062F POS MACROALBUMINURIA REV: CPT | Performed by: INTERNAL MEDICINE

## 2024-12-05 PROCEDURE — 3080F DIAST BP >= 90 MM HG: CPT | Performed by: INTERNAL MEDICINE

## 2024-12-05 PROCEDURE — 3077F SYST BP >= 140 MM HG: CPT | Performed by: INTERNAL MEDICINE

## 2024-12-05 PROCEDURE — 99214 OFFICE O/P EST MOD 30 MIN: CPT | Performed by: INTERNAL MEDICINE

## 2024-12-05 PROCEDURE — 1036F TOBACCO NON-USER: CPT | Performed by: INTERNAL MEDICINE

## 2024-12-05 PROCEDURE — 1123F ACP DISCUSS/DSCN MKR DOCD: CPT | Performed by: INTERNAL MEDICINE

## 2024-12-05 PROCEDURE — 1158F ADVNC CARE PLAN TLK DOCD: CPT | Performed by: INTERNAL MEDICINE

## 2024-12-05 PROCEDURE — 3044F HG A1C LEVEL LT 7.0%: CPT | Performed by: INTERNAL MEDICINE

## 2024-12-05 PROCEDURE — 3008F BODY MASS INDEX DOCD: CPT | Performed by: INTERNAL MEDICINE

## 2024-12-05 PROCEDURE — 1159F MED LIST DOCD IN RCRD: CPT | Performed by: INTERNAL MEDICINE

## 2024-12-05 PROCEDURE — G0439 PPPS, SUBSEQ VISIT: HCPCS | Performed by: INTERNAL MEDICINE

## 2024-12-05 RX ORDER — AMLODIPINE BESYLATE 5 MG/1
5 TABLET ORAL DAILY
Qty: 90 TABLET | Refills: 1 | Status: SHIPPED | OUTPATIENT
Start: 2024-12-05 | End: 2025-06-03

## 2024-12-05 ASSESSMENT — ACTIVITIES OF DAILY LIVING (ADL)
TAKING_MEDICATION: INDEPENDENT
MANAGING_FINANCES: INDEPENDENT
GROCERY_SHOPPING: INDEPENDENT
DOING_HOUSEWORK: INDEPENDENT
BATHING: INDEPENDENT
DRESSING: INDEPENDENT

## 2024-12-05 ASSESSMENT — PATIENT HEALTH QUESTIONNAIRE - PHQ9
2. FEELING DOWN, DEPRESSED OR HOPELESS: NOT AT ALL
SUM OF ALL RESPONSES TO PHQ9 QUESTIONS 1 AND 2: 0
1. LITTLE INTEREST OR PLEASURE IN DOING THINGS: NOT AT ALL

## 2024-12-05 NOTE — ASSESSMENT & PLAN NOTE
Orders:    Referral to Endocrinology; Future    Comprehensive Metabolic Panel; Future    Lipid Panel; Future    Hemoglobin A1C; Future    Albumin-Creatinine Ratio, Urine Random; Future

## 2024-12-05 NOTE — PROGRESS NOTES
"Subjective   Reason for Visit: Mani Larios is an 74 y.o. male here for a Medicare Wellness visit and follow up.     Past Medical, Surgical, and Family History reviewed and updated in chart.    Reviewed all medications by prescribing practitioner or clinical pharmacist (such as prescriptions, OTCs, herbal therapies and supplements) and documented in the medical record.    HPI  In [facility] in June, was going up escaltor, ran into crowd of people at the top who werent moving, fell into them.  After noticed some pain in anterior, lateral, and posterior R hip; lateral L hip, and anterior R knee. Saw Dr in [place] and took pelvic xray, told no fracture and told soft tissue injury.  No numbness/tingling,  no weakness in the legs.    For the past 2 months having daily headaches either in left occipital or  left temporal areas. Some days pain 6 of 10, most days 3 or 4. No vision changes. No numbness/tingling.  Some days wakes up with a headache. Some nights it wakes him up at night. No chronic neck pain.    Still occasionally has episodes of feeling weak, sweaty, thinking unclear and sugars in 50-60s. Happens when he doesn't eat for a long time. Feels better after he eats glucose. Not on any diabetes meds.    Bps mostly 130-140. This AM /79.  Didn't start nifedipine back up after last visit.    Does mention that on his BP monitor it occasionally shows an abnormal heart beat.    Sneezing, itchy eyes, watery eyes still bothering. Used flonase for weeks at a time before but didn't do much. Using saline now and allergy med every few days.      Patient Care Team:  Adonis Silva MD as PCP - General (Internal Medicine)  Adonis Silva MD as PCP - [employer] PCP     Review of Systems    Objective   Vitals:  /90 (BP Location: Right arm, Patient Position: Sitting)   Pulse 87   Ht 1.676 m (5' 6\")   Wt 64 kg (141 lb)   SpO2 92%   BMI 22.76 kg/m²       Physical Exam  Constitutional:       " Appearance: Normal appearance.   Eyes:      Extraocular Movements: Extraocular movements intact.      Pupils: Pupils are equal, round, and reactive to light.   Cardiovascular:      Rate and Rhythm: Normal rate and regular rhythm.      Heart sounds: No murmur heard.  Pulmonary:      Effort: Pulmonary effort is normal.      Breath sounds: Normal breath sounds.   Musculoskeletal:      Right lower leg: No edema.      Left lower leg: No edema.      Comments: Foot exam normal - normal sensation, no skin breakdown, DP pulses 2+    R hip nontender, full ROM  R knee full ROM, +crepitus, nontender   Neurological:      General: No focal deficit present.      Mental Status: He is alert.      Cranial Nerves: No cranial nerve deficit.      Motor: No weakness.      Gait: Gait normal.         Assessment & Plan  Routine general medical examination at health care facility    Orders:    1 Year Follow Up In Primary Care - Wellness Exam; Future    Medicare annual wellness visit, subsequent         Type 2 diabetes mellitus without complication, without long-term current use of insulin (Multi)    Orders:    Referral to Endocrinology; Future    Comprehensive Metabolic Panel; Future    Lipid Panel; Future    Hemoglobin A1C; Future    Albumin-Creatinine Ratio, Urine Random; Future    Primary hypertension    Orders:    CBC; Future    amLODIPine (Norvasc) 5 mg tablet; Take 1 tablet (5 mg) by mouth once daily.    Irregular heart beat    Orders:    Holter or Event Cardiac Monitor; Future    Magnesium; Future    Chronic pain of right knee    Orders:    XR knee right 3 views; Future    Chronic right hip pain    Orders:    XR hip right with pelvis when performed 2 or 3 views; Future    New onset of headaches after age 50    Orders:    MR brain wo IV contrast; Future    Heart palpitations    Orders:    Holter or Event Cardiac Monitor; Future                Diabetes   -Referred to Endo given the hypos in the setting of not being on any DM  meds  -Optho - UTD     HTN   -Cont losartan  -Add amlodipine     Low testosterone  -Previously following with Dr Kuhn but not being treated, will monitor     HLP  -Continue statin and zetia     Raynauds  -Add amlodipine     Chronic allergic rhinitis  -Do flonase daily with zyrtec daily     CAC score 588 in 2020  -Continue statin  -Seeing Cardiology     LUTS, microscopic hematuria - Saw Urology. Neg hematuria workup.    New onset headaches - Will get MRI. Possibly related to uncontrolled HTN, addressed as above.    R hip pain, R knee pain, chronic - will start with xrays, possible PT after I see results    Abnormal heart rhythm on BP cuff - Normal rhythm today. Will get heart monitor.     Health maintenance  -Had colonoscopy 8/2021, due in 10 years  -PSA nml 3/2024  -Immunizations    -Pneumonia - UTD with both     f/u 2 months

## 2024-12-05 NOTE — ASSESSMENT & PLAN NOTE
Orders:    CBC; Future    amLODIPine (Norvasc) 5 mg tablet; Take 1 tablet (5 mg) by mouth once daily.

## 2025-01-04 ENCOUNTER — APPOINTMENT (OUTPATIENT)
Dept: RADIOLOGY | Facility: HOSPITAL | Age: 75
End: 2025-01-04
Payer: MEDICARE

## 2025-01-17 ENCOUNTER — LAB (OUTPATIENT)
Dept: LAB | Facility: LAB | Age: 75
End: 2025-01-17
Payer: MEDICARE

## 2025-01-17 ENCOUNTER — HOSPITAL ENCOUNTER (OUTPATIENT)
Dept: RADIOLOGY | Facility: HOSPITAL | Age: 75
Discharge: HOME | End: 2025-01-17
Payer: MEDICARE

## 2025-01-17 DIAGNOSIS — M25.551 CHRONIC RIGHT HIP PAIN: ICD-10-CM

## 2025-01-17 DIAGNOSIS — M25.561 CHRONIC PAIN OF RIGHT KNEE: ICD-10-CM

## 2025-01-17 DIAGNOSIS — E11.9 TYPE 2 DIABETES MELLITUS WITHOUT COMPLICATION, WITHOUT LONG-TERM CURRENT USE OF INSULIN (MULTI): ICD-10-CM

## 2025-01-17 DIAGNOSIS — G89.29 CHRONIC PAIN OF RIGHT KNEE: ICD-10-CM

## 2025-01-17 DIAGNOSIS — I49.9 IRREGULAR HEART BEAT: ICD-10-CM

## 2025-01-17 DIAGNOSIS — D64.9 ANEMIA, UNSPECIFIED TYPE: ICD-10-CM

## 2025-01-17 DIAGNOSIS — G89.29 CHRONIC RIGHT HIP PAIN: ICD-10-CM

## 2025-01-17 DIAGNOSIS — I10 PRIMARY HYPERTENSION: Chronic | ICD-10-CM

## 2025-01-17 LAB
ALBUMIN SERPL BCP-MCNC: 4.1 G/DL (ref 3.4–5)
ALP SERPL-CCNC: 79 U/L (ref 33–136)
ALT SERPL W P-5'-P-CCNC: 20 U/L (ref 10–52)
ANION GAP SERPL CALC-SCNC: 10 MMOL/L (ref 10–20)
AST SERPL W P-5'-P-CCNC: 21 U/L (ref 9–39)
BILIRUB SERPL-MCNC: 0.5 MG/DL (ref 0–1.2)
BUN SERPL-MCNC: 15 MG/DL (ref 6–23)
CALCIUM SERPL-MCNC: 9.1 MG/DL (ref 8.6–10.3)
CHLORIDE SERPL-SCNC: 103 MMOL/L (ref 98–107)
CHOLEST SERPL-MCNC: 142 MG/DL (ref 0–199)
CHOLESTEROL/HDL RATIO: 3
CO2 SERPL-SCNC: 28 MMOL/L (ref 21–32)
CREAT SERPL-MCNC: 0.93 MG/DL (ref 0.5–1.3)
CREAT UR-MCNC: 92.4 MG/DL (ref 20–370)
EGFRCR SERPLBLD CKD-EPI 2021: 87 ML/MIN/1.73M*2
ERYTHROCYTE [DISTWIDTH] IN BLOOD BY AUTOMATED COUNT: 12.1 % (ref 11.5–14.5)
FERRITIN SERPL-MCNC: 99 NG/ML (ref 20–300)
FOLATE SERPL-MCNC: 21.5 NG/ML
GLUCOSE SERPL-MCNC: 139 MG/DL (ref 74–99)
HCT VFR BLD AUTO: 39 % (ref 41–52)
HDLC SERPL-MCNC: 47.1 MG/DL
HGB BLD-MCNC: 12.9 G/DL (ref 13.5–17.5)
IRON SATN MFR SERPL: 17 % (ref 25–45)
IRON SERPL-MCNC: 64 UG/DL (ref 35–150)
LDLC SERPL CALC-MCNC: 73 MG/DL
MAGNESIUM SERPL-MCNC: 1.85 MG/DL (ref 1.6–2.4)
MCH RBC QN AUTO: 28.7 PG (ref 26–34)
MCHC RBC AUTO-ENTMCNC: 33.1 G/DL (ref 32–36)
MCV RBC AUTO: 87 FL (ref 80–100)
MICROALBUMIN UR-MCNC: 491.4 MG/L
MICROALBUMIN/CREAT UR: 531.8 UG/MG CREAT
NON HDL CHOLESTEROL: 95 MG/DL (ref 0–149)
NRBC BLD-RTO: 0 /100 WBCS (ref 0–0)
PLATELET # BLD AUTO: 218 X10*3/UL (ref 150–450)
POTASSIUM SERPL-SCNC: 3.8 MMOL/L (ref 3.5–5.3)
PROT SERPL-MCNC: 6.8 G/DL (ref 6.4–8.2)
RBC # BLD AUTO: 4.49 X10*6/UL (ref 4.5–5.9)
SODIUM SERPL-SCNC: 137 MMOL/L (ref 136–145)
TIBC SERPL-MCNC: 367 UG/DL (ref 240–445)
TRIGL SERPL-MCNC: 110 MG/DL (ref 0–149)
UIBC SERPL-MCNC: 303 UG/DL (ref 110–370)
VIT B12 SERPL-MCNC: 439 PG/ML (ref 211–911)
VLDL: 22 MG/DL (ref 0–40)
WBC # BLD AUTO: 6.4 X10*3/UL (ref 4.4–11.3)

## 2025-01-17 PROCEDURE — 82607 VITAMIN B-12: CPT

## 2025-01-17 PROCEDURE — 82746 ASSAY OF FOLIC ACID SERUM: CPT

## 2025-01-17 PROCEDURE — 82570 ASSAY OF URINE CREATININE: CPT

## 2025-01-17 PROCEDURE — 80053 COMPREHEN METABOLIC PANEL: CPT

## 2025-01-17 PROCEDURE — 83540 ASSAY OF IRON: CPT

## 2025-01-17 PROCEDURE — 82043 UR ALBUMIN QUANTITATIVE: CPT

## 2025-01-17 PROCEDURE — 83036 HEMOGLOBIN GLYCOSYLATED A1C: CPT

## 2025-01-17 PROCEDURE — 82728 ASSAY OF FERRITIN: CPT

## 2025-01-17 PROCEDURE — 73502 X-RAY EXAM HIP UNI 2-3 VIEWS: CPT | Mod: RT

## 2025-01-17 PROCEDURE — 85027 COMPLETE CBC AUTOMATED: CPT

## 2025-01-17 PROCEDURE — 83735 ASSAY OF MAGNESIUM: CPT

## 2025-01-17 PROCEDURE — 83550 IRON BINDING TEST: CPT

## 2025-01-17 PROCEDURE — 80061 LIPID PANEL: CPT

## 2025-01-17 PROCEDURE — 73562 X-RAY EXAM OF KNEE 3: CPT | Mod: RT

## 2025-01-20 ENCOUNTER — HOSPITAL ENCOUNTER (OUTPATIENT)
Dept: RADIOLOGY | Facility: HOSPITAL | Age: 75
Discharge: HOME | End: 2025-01-20
Payer: MEDICARE

## 2025-01-20 DIAGNOSIS — R51.9 NEW ONSET OF HEADACHES AFTER AGE 50: ICD-10-CM

## 2025-01-20 LAB
EST. AVERAGE GLUCOSE BLD GHB EST-MCNC: 148 MG/DL
HBA1C MFR BLD: 6.8 %

## 2025-01-20 PROCEDURE — 70551 MRI BRAIN STEM W/O DYE: CPT

## 2025-01-20 PROCEDURE — 70551 MRI BRAIN STEM W/O DYE: CPT | Performed by: RADIOLOGY

## 2025-01-21 DIAGNOSIS — G89.29 CHRONIC RIGHT HIP PAIN: ICD-10-CM

## 2025-01-21 DIAGNOSIS — M25.561 CHRONIC PAIN OF RIGHT KNEE: Primary | ICD-10-CM

## 2025-01-21 DIAGNOSIS — M25.551 CHRONIC RIGHT HIP PAIN: ICD-10-CM

## 2025-01-21 DIAGNOSIS — G89.29 CHRONIC PAIN OF RIGHT KNEE: Primary | ICD-10-CM

## 2025-02-06 ENCOUNTER — APPOINTMENT (OUTPATIENT)
Dept: PRIMARY CARE | Facility: CLINIC | Age: 75
End: 2025-02-06
Payer: MEDICARE

## 2025-02-06 VITALS
SYSTOLIC BLOOD PRESSURE: 151 MMHG | BODY MASS INDEX: 22.98 KG/M2 | HEIGHT: 66 IN | OXYGEN SATURATION: 96 % | WEIGHT: 143 LBS | HEART RATE: 71 BPM | DIASTOLIC BLOOD PRESSURE: 92 MMHG

## 2025-02-06 DIAGNOSIS — I49.9 IRREGULAR HEART BEAT: ICD-10-CM

## 2025-02-06 DIAGNOSIS — I10 PRIMARY HYPERTENSION: Primary | Chronic | ICD-10-CM

## 2025-02-06 DIAGNOSIS — E11.9 TYPE 2 DIABETES MELLITUS WITHOUT COMPLICATION, WITHOUT LONG-TERM CURRENT USE OF INSULIN (MULTI): ICD-10-CM

## 2025-02-06 PROCEDURE — 1036F TOBACCO NON-USER: CPT | Performed by: INTERNAL MEDICINE

## 2025-02-06 PROCEDURE — 3048F LDL-C <100 MG/DL: CPT | Performed by: INTERNAL MEDICINE

## 2025-02-06 PROCEDURE — 4010F ACE/ARB THERAPY RXD/TAKEN: CPT | Performed by: INTERNAL MEDICINE

## 2025-02-06 PROCEDURE — 3044F HG A1C LEVEL LT 7.0%: CPT | Performed by: INTERNAL MEDICINE

## 2025-02-06 PROCEDURE — 3008F BODY MASS INDEX DOCD: CPT | Performed by: INTERNAL MEDICINE

## 2025-02-06 PROCEDURE — 1159F MED LIST DOCD IN RCRD: CPT | Performed by: INTERNAL MEDICINE

## 2025-02-06 PROCEDURE — 3062F POS MACROALBUMINURIA REV: CPT | Performed by: INTERNAL MEDICINE

## 2025-02-06 PROCEDURE — 1158F ADVNC CARE PLAN TLK DOCD: CPT | Performed by: INTERNAL MEDICINE

## 2025-02-06 PROCEDURE — 99214 OFFICE O/P EST MOD 30 MIN: CPT | Performed by: INTERNAL MEDICINE

## 2025-02-06 PROCEDURE — 1123F ACP DISCUSS/DSCN MKR DOCD: CPT | Performed by: INTERNAL MEDICINE

## 2025-02-06 PROCEDURE — G2211 COMPLEX E/M VISIT ADD ON: HCPCS | Performed by: INTERNAL MEDICINE

## 2025-02-06 PROCEDURE — 3080F DIAST BP >= 90 MM HG: CPT | Performed by: INTERNAL MEDICINE

## 2025-02-06 PROCEDURE — 3077F SYST BP >= 140 MM HG: CPT | Performed by: INTERNAL MEDICINE

## 2025-02-06 RX ORDER — OLMESARTAN MEDOXOMIL 40 MG/1
40 TABLET ORAL DAILY
Qty: 90 TABLET | Refills: 1 | Status: SHIPPED | OUTPATIENT
Start: 2025-02-06 | End: 2025-08-05

## 2025-02-06 RX ORDER — LINAGLIPTIN 5 MG/1
5 TABLET, FILM COATED ORAL DAILY
COMMUNITY

## 2025-02-06 ASSESSMENT — PATIENT HEALTH QUESTIONNAIRE - PHQ9
SUM OF ALL RESPONSES TO PHQ9 QUESTIONS 1 & 2: 0
2. FEELING DOWN, DEPRESSED OR HOPELESS: NOT AT ALL
1. LITTLE INTEREST OR PLEASURE IN DOING THINGS: NOT AT ALL

## 2025-02-06 NOTE — PROGRESS NOTES
"Subjective   Patient ID: Mani Larios is a 74 y.o. male who presents for Follow-up and Medication Reaction.    HPI   Has PT scheduled for next week for the hip and knee.    Headaches have since resolved.    Saw Dr Patrick, started on tradjenta    Bps at home have been 120-130s/70s.  Took amlodipine for a few days and caused some aches and BP wasn't different so he stopped it.    No further abnormal heart rhythm on BP monitor.       Review of Systems    Objective   BP (!) 151/92 (BP Location: Right arm, Patient Position: Sitting)   Pulse 71   Ht 1.676 m (5' 6\")   Wt 64.9 kg (143 lb)   SpO2 96%   BMI 23.08 kg/m²     Physical Exam  Constitutional:       Appearance: Normal appearance.   Cardiovascular:      Rate and Rhythm: Normal rate and regular rhythm.      Heart sounds: No murmur heard.  Pulmonary:      Effort: Pulmonary effort is normal.      Breath sounds: Normal breath sounds.   Musculoskeletal:      Right lower leg: No edema.      Left lower leg: No edema.   Neurological:      General: No focal deficit present.      Mental Status: He is alert.      Gait: Gait normal.         Assessment/Plan   Problem List Items Addressed This Visit             ICD-10-CM    Hypertension - Primary (Chronic) I10    Relevant Medications    olmesartan (BENIcar) 40 mg tablet    Type 2 diabetes mellitus without complication, without long-term current use of insulin (Multi) E11.9    Irregular heart beat I49.9          Diabetes   -Seeing Endo  -Optho - UTD     HTN   -slightly high at home. Change losartan to olmesartan.     Low testosterone  -Previously following with Dr Kuhn but not being treated, will monitor     HLP  -Continue statin and zetia     Raynauds  -Monitor off meds     Chronic allergic rhinitis  -Do flonase daily with zyrtec daily     CAC score 588 in 2020  -Continue statin  -Seeing Cardiology     LUTS, microscopic hematuria - Saw Urology. Neg hematuria workup.     R hip pain, R knee pain, chronic - start PT   "   Abnormal heart rhythm on BP cuff - Appears resolved. He will monitor and get heart monitor done if he sees this again.     Health maintenance  -Had colonoscopy 8/2021, due in 10 years  -PSA nml 3/2024  -Immunizations    -Pneumonia - UTD with both     f/u 3 months

## 2025-02-11 ENCOUNTER — PATIENT MESSAGE (OUTPATIENT)
Dept: PRIMARY CARE | Facility: CLINIC | Age: 75
End: 2025-02-11
Payer: MEDICARE

## 2025-02-13 ENCOUNTER — EVALUATION (OUTPATIENT)
Dept: PHYSICAL THERAPY | Facility: CLINIC | Age: 75
End: 2025-02-13
Payer: MEDICARE

## 2025-02-13 DIAGNOSIS — M25.561 CHRONIC PAIN OF RIGHT KNEE: ICD-10-CM

## 2025-02-13 DIAGNOSIS — M25.551 CHRONIC RIGHT HIP PAIN: ICD-10-CM

## 2025-02-13 DIAGNOSIS — G89.29 CHRONIC RIGHT HIP PAIN: ICD-10-CM

## 2025-02-13 DIAGNOSIS — G89.29 CHRONIC PAIN OF RIGHT KNEE: ICD-10-CM

## 2025-02-13 PROCEDURE — 97161 PT EVAL LOW COMPLEX 20 MIN: CPT | Mod: GP | Performed by: PHYSICAL THERAPIST

## 2025-02-13 PROCEDURE — 97110 THERAPEUTIC EXERCISES: CPT | Mod: GP | Performed by: PHYSICAL THERAPIST

## 2025-02-13 ASSESSMENT — ENCOUNTER SYMPTOMS
OCCASIONAL FEELINGS OF UNSTEADINESS: 0
DEPRESSION: 0
LOSS OF SENSATION IN FEET: 0

## 2025-02-13 ASSESSMENT — PATIENT HEALTH QUESTIONNAIRE - PHQ9
SUM OF ALL RESPONSES TO PHQ9 QUESTIONS 1 AND 2: 0
1. LITTLE INTEREST OR PLEASURE IN DOING THINGS: NOT AT ALL
2. FEELING DOWN, DEPRESSED OR HOPELESS: NOT AT ALL

## 2025-02-13 ASSESSMENT — COLUMBIA-SUICIDE SEVERITY RATING SCALE - C-SSRS
2. HAVE YOU ACTUALLY HAD ANY THOUGHTS OF KILLING YOURSELF?: NO
1. IN THE PAST MONTH, HAVE YOU WISHED YOU WERE DEAD OR WISHED YOU COULD GO TO SLEEP AND NOT WAKE UP?: NO
6. HAVE YOU EVER DONE ANYTHING, STARTED TO DO ANYTHING, OR PREPARED TO DO ANYTHING TO END YOUR LIFE?: NO

## 2025-02-13 NOTE — PROGRESS NOTES
Patient Name Mani Larios  MRN: [id number]  Today's Date: 2/13/2025  Time Calculation  Start Time: 0315  Stop Time: 0400  Time Calculation (min): 45 min    Insurance:   Visit #: 1  Insurance Reviewed  (per information provided by  pre-cert team)   [facility] MCR/$25 COPAY VISITS ARE MED NEC NEEDS AUTH OPTUM PAYS % OOP 4500.00 165.00 APPLIED   Authorization required:  Y  Approved # of visits: NV  Authorization date range:  NV    Therapy Diagnoses:   Problem List Items Addressed This Visit             ICD-10-CM    Chronic right hip pain M25.551, G89.29    Relevant Orders    Follow Up In Physical Therapy    Chronic pain of right knee M25.561, G89.29    Relevant Orders    Follow Up In Physical Therapy     General:  Reason for visit: R hip and R knee pain   Referred by: Adonis Silva MD Next MD appt:  5-6-25  Preferred Name:  Mani  Script:  PT  Onset Date:  6-1-24  Assessment/re-assessment dates: 2-13-25  Email/phone #:  [email or web address]  Access Code: [email or web address]    Subjective:  HPI:  Patient reports that he was on an escalator in June 2024 and the people in front of him stopped and he fell into them and then the people behind him fell into him.  Patient had onset of R hip and knee pain, patient reports pain on L as well, but PT script for R hip and knee.  Patient continued to have pain and MD ordered x-ray that showed mild OA in the R hip and knee.  Patient is referred to outpatient PT.     Pain:  7-8/10 ant/post R hip/aching  3-4/10 R lateral knee aching.  L hip laterally:  6/10 aching  What increases pain:  planted foot with rotation  What decreases pain:  heat    Medical Hx/  Fall Risk:  no  Steadi:  2  yes  Precautions: Type II DM, HTN, high cholesterol, see meds in chart    Patient goal:  Decreased pain and increased function.   Patient is aware of diagnosis and prognosis.    Living Environment: multi-level home with rail   Social Support:  lives with:  wife and family  DME:   none      Prior Function:   WNL  "prior to onset    Function:    A and O x 3  Sleep: wakes from pain at times,  instructed in proper postures.  ADL's:  painful with dressing LE's in hip and knee.   Chores:  paces with yard work and painful, self propelled push mower, .    Driving:  painful with driving and transfers.    Work: retired  Recreation: wants to resume walking outdoors for fitness.     Objective:    Outcome Measures:  Other Measures  Lower Extremity Funtional Score (LEFS): 29 /64: 55% limitation of function    Posture:  mor for head and rounded shoulders.     Gait/stairs:  decreased stance time, decreased hip ext and trunk rotation, reciprocal pattern on stairs without UE support with LE WB.      Balance:  R SLS:  6\"  L SLS:  20\"    ROM:  R knee ext/flex:  A: WNL and R hip AROM WNL throughout except:   R hip extension:  0/12    MMT:   Hip flexors: 4+/5 B  ext: 3-/5 B  abd: 4-/5  add: 4/5  Quads: 4+/5 B  Hams: 4+/5  DF: 5/5 B  PF: (NWB):  5/5 B  Hip IR:  4+/5 B  Hip ER:  4+/5 B    Flexibility:    Hams:  90/90: -35 B  Heelcords: 8 B  Hip flexors:  mod B  IT band: min B     Palpation: - pop    Treatment:    Evaluation:  30 minutes    **= HEP progression today NV= Next visit  np= not performed  nb= non-billable  G= group  HEP= discharged to [facility].     Therapeutic Exercise:  8  Nu-step(subjective taken/education):    NV  R hip flexor/calf  and hams stretch:  3/30\" ea. **  R IT Band stretch: NV    LAQ:  NV  seated hams curl with t-band: NV    NMR:  2  R SLS:  x 5/6\" max**  R T-band TKE:  NV  Partial squats:  NV    Modalities:  prn    Self Care Home Management:    minutes  Education:  poc, anatomy, physiology, posture, body mechanics, safety awareness, HEP(see below).  Preferred learning:  pictures, demonstration.  Demonstrated good understanding.                     Assessment:    Evolving with changing characteristics  Level of complexity:  low    Patient presents with flare up of R knee/hip OA/muscle strain, signs and symptoms are " "consistent with diagnosis and patient is an excellent candidate for Physical Therapy and needs work on ROM, flexibility, posture, closed chain, strength, balance, gait and stairs for improved overall function.    Problems:    Pain:  _x__  Posture/Body mechanics deficits:  ___  Decreased knowledge HEP:  _x__  Decreased knowledge of Precautions:  ___  Activity Limitations:   _x__  ADL's/IADL's/Self-care skills:  _x__  ROM/Joint Mobility:  __x_  Strength:  _x__  Decreased functional level:   _x__  Flexibility:  _x__  Tenderness/decreased soft tissue mobility:  ___  Gait/Stairs:  __x_  Fall Risk:  ___  Balance:  __x_  Edema:  ___  Participation restrictions:  ___  Sensory:  ___  Transfers:  _x__  Decreased knowledge of brace:   ___  Other:  ___    X Indicates included in problem list    Goals:    STG:  In two weeks.   -Increased postural awareness.   -Compliant with HEP  LTG: by discharge  -Increased postural awareness and posture WFL.  -Decreased R hip and knee pain to 2/10 and patient I with self-management of symptoms.   -Increased function with ADL's and IADL's.  Improve LEFS to:  20 %  limitation of function.  -Normal gait pattern  on level and uneven surfaces community level distances for improved function in the community.   -Increase  R SLS to 20\"  -Increase R hip AROM to WFL for improved function with normal hip extension at terminal stance phase of gait.   -Increase R LE strength to WNL for improved function with chores and able to resume walking program.  -Increase R LE flexibility to WFL.    -I and compliant with HEP and proper: R LE care.      Rehab potential to achieve the above goals is good.    Patient is aware of diagnosis and prognosis and agrees with established plan of care and goals.    Plan:   Skilled PT 2 x/week for 8 weeks( Until goals achieved, maximum rehab potential has been achieved, or patient has plateaued)  for:    Aquatics  __x___prn  CP   __x__  Vasopneumatic device __x__  Dry needling  " ____  Education  __x__  Electrical Stimulation  _x___  Gait training  _x___  HEP  __x__  Manual  __x__  Mechanical Traction  ____  NMR  _x___  Self-care/home management  _x___  Therapeutic Exercise   _x___  Therapeutic Activities  __x__  US  __x__  Work Conditioning  ____  Re-assessment  __x__  Other  ____    X Indicates included in treatment plan    PT for Nu-step for functional mobility and soft tissue warm up for more efficient stretching, work on ROM, flexibility, posture, closed chain, strength, balance, gait and stairs for improved overall function.    HEP:      Access Code: [email or web address]  URL: [email or web address]  Date: 02/13/2025  Prepared by: Deb Rivera    Exercises  - Standing Hamstring Stretch on Chair  - 1-2 x daily - 7 x weekly - 1 sets - 3 reps - 30 second hold  - Hip Flexor Stretch with Chair  - 1-2 x daily - 7 x weekly - 1 sets - 3 reps - 30 second hold  - Single Leg Stance (Mirrored)  - 1 x daily - 7 x weekly - 1 sets - 5-10 reps - up to 30 second hold

## 2025-04-03 ENCOUNTER — TREATMENT (OUTPATIENT)
Dept: PHYSICAL THERAPY | Facility: CLINIC | Age: 75
End: 2025-04-03
Payer: MEDICARE

## 2025-04-03 DIAGNOSIS — G89.29 CHRONIC RIGHT HIP PAIN: ICD-10-CM

## 2025-04-03 DIAGNOSIS — G89.29 CHRONIC PAIN OF RIGHT KNEE: ICD-10-CM

## 2025-04-03 DIAGNOSIS — M25.561 CHRONIC PAIN OF RIGHT KNEE: ICD-10-CM

## 2025-04-03 DIAGNOSIS — M25.551 CHRONIC RIGHT HIP PAIN: ICD-10-CM

## 2025-04-03 PROCEDURE — 97112 NEUROMUSCULAR REEDUCATION: CPT | Mod: GP | Performed by: PHYSICAL THERAPIST

## 2025-04-03 PROCEDURE — 97110 THERAPEUTIC EXERCISES: CPT | Mod: GP | Performed by: PHYSICAL THERAPIST

## 2025-04-03 NOTE — PROGRESS NOTES
"Physical Therapy  Physical Therapy Progress Note    Patient Name Mani Larios   MRN: [id number]  Today's Date: 4/3/2025  Time Calculation  Start Time: 0815  Stop Time: 0853  Time Calculation (min): 38 min    Insurance:    Visit #:   2 /16  Insurance Reviewed  (per information provided by  pre-cert team)   [facility] MCR/$25 COPAY VISITS ARE MED NEC NEEDS AUTH OPTUM PAYS % OOP 4500.00 165.00 APPLIED   Authorization required:  Y  Approved # of visits: 16 visits   Authorization date range:  2/13-4/24/25  Therapy Diagnoses:   Problem List Items Addressed This Visit             ICD-10-CM    Chronic right hip pain M25.551, G89.29    Chronic pain of right knee M25.561, G89.29     General:  Reason for visit: R hip and R knee pain   Referred by: Adonis Silva MD Next MD appt:  5-6-25  Preferred Name:  Mani  Script:  PT  Onset Date:  6-1-24  Assessment/re-assessment dates: 2-13-25  Email/phone #:  [email or web address]  Access Code: [email or web address]    Precautions:  no fall risk  Type II DM, HTN, high cholesterol, see meds in chart     Subjective:   Patient reports:  that he was out of town until last week since the evaluation.  His R knee pain is better, but he is still having R post hip pain.      Have you fallen since last visit:  no      Pain:  5-6/10 R lateral hip/buttock  Location/Type of pain:  aching    HEP compliance/understanding:  once per week since eval    Objective:   Objective Measurements:    Function:   no longer waking from pain.  Continues to have pain in the R buttock with dressing LE's/less pain with driving and car transfers.    Gait: has only walked for fitness once for 15-20 and it was fine, wants to work up to 35'  Balance: R SLS:  15\"    Treatment:   **= HEP progression today NV= Next visit  np= not performed  nb= non-billable  G= group HEP= discharged to [facility]  Therapeutic Exercise:       minutes  Nu-step(subjective taken/education):    Lev 3 x 10'  R hip flexor/calf  and hams stretch:  3/30\" ea.   R IT " "Band stretch: 3/30\" **  Supine R piriformis stretch:  3/30\"**  Supine figure 4 stretch:  NV  Shuttle Leg Press:  NV  Shuttle Calf Raises:  NV    Neuromuscular Re-education:      minutes  R SLS:  x 5/15\" max  R T-band TKE:  Green/20/5\"**  BOSU Partial squats:  x 20(per patient does squats daily at home)  Airex R SLS with 3 way hip:  x 20 ea. B light UE support  Steamboats OKC/CKC\"  NV    Education:  exercise and HEP progression    Assessment:  Patient tolerated treatment well, did well with progression this date.    Patient needs continued work on/skilled PT for: closed chain strength and flexibility to address remaining functional, objective and subjective deficits to allow them to return to prior /optimal level of function with ADLs.  Patient is progressing with goals: improved balance and less knee pain.   Skilled care:  exercise/HEP progression    Plan:    Continue to progress per poc:   NV add Progression of closed chain and functional strength.     HEP:      Access Code: [email or web address]  URL: [email or web address]  Date: 04/03/2025  Prepared by: Deb Rivera    Program Notes  Do the laying down exercises on the bed, not on the floor    Exercises  - Standing Hamstring Stretch on Chair  - 1-2 x daily - 7 x weekly - 1 sets - 3 reps - 30 second hold  - Hip Flexor Stretch with Chair  - 1-2 x daily - 7 x weekly - 1 sets - 3 reps - 30 second hold  - Single Leg Stance (Mirrored)  - 1 x daily - 7 x weekly - 1 sets - 5-10 reps - up to 30 second hold  - ITB Stretch at Wall (Mirrored)  - 1 x daily - 7 x weekly - 1 sets - 3 reps - 30 second hold  - Standing Terminal Knee Extension with Resistance  - 1 x daily - 7 x weekly - 3 sets - 10 reps - 5 second hold  - Supine Piriformis Stretch with Foot on Ground  - 1 x daily - 7 x weekly - 1 sets - 3 reps - 30 second hold  "

## 2025-04-08 ENCOUNTER — TREATMENT (OUTPATIENT)
Dept: PHYSICAL THERAPY | Facility: CLINIC | Age: 75
End: 2025-04-08
Payer: MEDICARE

## 2025-04-08 DIAGNOSIS — M25.551 CHRONIC RIGHT HIP PAIN: ICD-10-CM

## 2025-04-08 DIAGNOSIS — G89.29 CHRONIC PAIN OF RIGHT KNEE: ICD-10-CM

## 2025-04-08 DIAGNOSIS — M25.561 CHRONIC PAIN OF RIGHT KNEE: ICD-10-CM

## 2025-04-08 DIAGNOSIS — G89.29 CHRONIC RIGHT HIP PAIN: ICD-10-CM

## 2025-04-08 PROCEDURE — 97110 THERAPEUTIC EXERCISES: CPT | Mod: GP | Performed by: PHYSICAL THERAPIST

## 2025-04-08 PROCEDURE — 97112 NEUROMUSCULAR REEDUCATION: CPT | Mod: GP | Performed by: PHYSICAL THERAPIST

## 2025-04-08 NOTE — PROGRESS NOTES
"Physical Therapy  Physical Therapy Progress Note    Patient Name Mani Larios   MRN: [id number]  Today's Date: 4/8/2025  Time Calculation  Start Time: 0815  Stop Time: 0900  Time Calculation (min): 45 min    Insurance:    Visit #:   3  /16  Insurance Reviewed  (per information provided by  pre-cert team)   [facility] MCR/$25 COPAY VISITS ARE MED NEC NEEDS AUTH OPTUM PAYS % OOP 4500.00 165.00 APPLIED   Authorization required:  Y  Approved # of visits: 16 visits   Authorization date range:  2/13-4/24/25    Therapy Diagnoses:   Problem List Items Addressed This Visit             ICD-10-CM    Chronic right hip pain M25.551, G89.29    Chronic pain of right knee M25.561, G89.29     General:  Reason for visit: R hip and R knee pain   Referred by: Adonis Sivla MD Next MD appt:  5-6-25  Preferred Name:  Mani  Script:  PT  Onset Date:  6-1-24  Assessment/re-assessment dates: 2-13-25  Email/phone #:  [email or web address]  Access Code: [email or web address]    Precautions:   no fall risk  Type II DM, HTN, high cholesterol, see meds in chart     Subjective:   Patient reports:  that he had some R groin pain /soreness after but it subsided in a few days.      Have you fallen since last visit:  no    Pain:  4/10 R groin aching/ R LS aching    HEP compliance/understanding:  yes    Objective:   Objective Measurements:    Function:   sleeping well, improved car transfers  ROM:  R SLS on Airex:  12\"    Treatment:   **= HEP progression today NV= Next visit  np= not performed  nb= non-billable  G= group HEP= discharged to [facility]  Therapeutic Exercise:     20 minutes  Nu-step(subjective taken/education):    Lev 3 x 10'  R hip flexor/calf  and hams stretch:  2/30\" ea.   R IT Band stretch: 2/30\" **  Supine R piriformis stretch:  2/30\"**  Supine figure 4 stretch:  3/30\"**  Shuttle Leg Press:  NV  Shuttle Calf Raises:  NV    Neuromuscular Re-education:    20 minutes  Airex R SLS:  x 5/12\" max  R T-band TKE:  Green/20/5\"**  BOSU Black Side up Partial " "squats:  x 20(per patient does squats daily at home)  Alt lunges on BOSU:  NV  Steamboats OKC/CKC\"  Red x 10 ea. (Issue for HEP when able)  Theraband 3 way pull downs  Not Performed today:    Airex R SLS with 3 way hip:  x 20 ea. B light UE support    Education:  exercise and HEP progression    Assessment:  Patient tolerated treatment well, did well with progression this date.    Patient needs continued work on/skilled PT for: closed chain strength and core stability to address remaining functional, objective and subjective deficits to allow them to return to prior /optimal level of function with ADLs.  Patient is progressing with goals: HEP compliance, overall decreased pain.   Skilled care:  exercise/HEP progression    Plan:    Continue to progress per poc:   NV add progression to core stability and progression of closed chain strength    HEP:      Access Code: [email or web address]  URL: [email or web address]  Date: 04/08/2025  Prepared by: Deb Rivera    Program Notes  Do the laying down exercises on the bed, not on the floor    Exercises  - Standing Hamstring Stretch on Chair  - 1-2 x daily - 7 x weekly - 1 sets - 3 reps - 30 second hold  - Hip Flexor Stretch with Chair  - 1-2 x daily - 7 x weekly - 1 sets - 3 reps - 30 second hold  - Single Leg Stance (Mirrored)  - 1 x daily - 7 x weekly - 1 sets - 5-10 reps - up to 30 second hold  - ITB Stretch at Wall (Mirrored)  - 1 x daily - 7 x weekly - 1 sets - 3 reps - 30 second hold  - Standing Terminal Knee Extension with Resistance  - 1 x daily - 7 x weekly - 3 sets - 10 reps - 5 second hold  - Supine Piriformis Stretch with Foot on Ground  - 1 x daily - 7 x weekly - 1 sets - 3 reps - 30 second hold  - Supine Figure 4 Piriformis Stretch  - 1 x daily - 7 x weekly - 1 sets - 3 reps - 30 second hold  "

## 2025-04-11 ENCOUNTER — TREATMENT (OUTPATIENT)
Dept: PHYSICAL THERAPY | Facility: CLINIC | Age: 75
End: 2025-04-11
Payer: MEDICARE

## 2025-04-11 DIAGNOSIS — M25.561 CHRONIC PAIN OF RIGHT KNEE: ICD-10-CM

## 2025-04-11 DIAGNOSIS — G89.29 CHRONIC PAIN OF RIGHT KNEE: ICD-10-CM

## 2025-04-11 DIAGNOSIS — M25.551 CHRONIC RIGHT HIP PAIN: ICD-10-CM

## 2025-04-11 DIAGNOSIS — G89.29 CHRONIC RIGHT HIP PAIN: ICD-10-CM

## 2025-04-11 PROCEDURE — 97110 THERAPEUTIC EXERCISES: CPT | Mod: GP | Performed by: PHYSICAL THERAPIST

## 2025-04-11 PROCEDURE — 97112 NEUROMUSCULAR REEDUCATION: CPT | Mod: GP | Performed by: PHYSICAL THERAPIST

## 2025-04-11 NOTE — PROGRESS NOTES
"Physical Therapy  Physical Therapy Progress Note    Patient Name Mani Larios   MRN: [id number]  Today's Date: 4/11/2025  Time Calculation  Start Time: 0830  Stop Time: 0912  Time Calculation (min): 42 min    Insurance:    Visit #:   4 /16  Insurance Reviewed  (per information provided by  pre-cert team)   [facility] MCR/$25 COPAY VISITS ARE MED NEC NEEDS AUTH OPTUM PAYS % OOP 4500.00 165.00 APPLIED   Authorization required:  Y  Approved # of visits: 16 visits   Authorization date range:  2/13-4/24/25    Therapy Diagnoses:   Problem List Items Addressed This Visit             ICD-10-CM    Chronic right hip pain M25.551, G89.29    Chronic pain of right knee M25.561, G89.29     General:  Reason for visit: R hip and R knee pain   Referred by: Adonis Silva MD Next MD appt:  5-6-25  Preferred Name:  Mani  Script:  PT  Onset Date:  6-1-24  Assessment/re-assessment dates: 2-13-25  Email/phone #:  [email or web address]  Access Code: [email or web address]    Precautions:  no fall risk  Type II DM, HTN, high cholesterol, see meds in chart     Subjective:   Patient reports:  that the R knee is feeling good, he is still having some pain on the R iliac crest and into the buttock area and into the R groin at times.      Have you fallen since last visit:  no    Pain:  3/10  Location/Type of pain:  aching R iliac crest and into buttock.     HEP compliance/understanding:  yes    Objective:   Objective Measurements:    Balance: R SLS on Airex:  8\"    Treatment:   **= HEP progression today NV= Next visit  np= not performed  nb= non-billable  G= group HEP= discharged to [facility]  Therapeutic Exercise:     25 minutes  Nu-step(subjective taken/education):    Lev 3 x 10'  R hip flexor/calf  and hams stretch:  2/30\" ea.   R IT Band stretch: 2/30\"   R SKTC:  3/30\"**  Supine R piriformis stretch:  2/30\"**  Supine figure 4 stretch:  3/30\"**  Shuttle Leg Press:  NV  Shuttle Calf Raises:  NV    Neuromuscular Re-education:    15 minutes  Airex R SLS:  x 5/8\" " "max  Steamboats OKC/CKC\"  Red x 10 ea. **  Theraband 3 way pull downs:  Green/ x 15 ea. (Add to HEP as able)  Theraband Obliques:  Green/ x 15 ea. (Add to HEP as able)  Not Performed today:    R T-band TKE:  Green/20/5\"  BOSU Black Side up Partial squats:  x 20(per patient does squats daily at home)  Alt lunges on BOSU:  NV    Education:  exercise and HEP progression    Assessment:  Patient tolerated treatment well, did well with progression this date.    Patient needs continued work on/skilled PT for: closed chain strength and flexibility to address remaining functional, objective and subjective deficits to allow them to return to prior /optimal level of function with ADLs.  Patient is progressing with goals: overall decreased pain and HEP compliance.    Skilled care:  exercise and HEP progression.     Plan:    Continue to progress per poc:   NV add progression of LE strength on Shuttle and BOSU lunges.  Consider IASTM or E-stim if R hip pain does not decrease further.     HEP:      Access Code: [email or web address]  URL: [email or web address]  Date: 04/11/2025  Prepared by: Deb Rivera    Program Notes  Do the laying down exercises on the bed, not on the floor    Exercises  - Standing Hamstring Stretch on Chair  - 1-2 x daily - 7 x weekly - 1 sets - 3 reps - 30 second hold  - Hip Flexor Stretch with Chair  - 1-2 x daily - 7 x weekly - 1 sets - 3 reps - 30 second hold  - Single Leg Stance (Mirrored)  - 1 x daily - 7 x weekly - 1 sets - 5-10 reps - up to 30 second hold  - ITB Stretch at Wall (Mirrored)  - 1 x daily - 7 x weekly - 1 sets - 3 reps - 30 second hold  - Standing Terminal Knee Extension with Resistance  - 1 x daily - 7 x weekly - 3 sets - 10 reps - 5 second hold  - Supine Piriformis Stretch with Foot on Ground  - 1 x daily - 7 x weekly - 1 sets - 3 reps - 30 second hold  - Supine Figure 4 Piriformis Stretch  - 1 x daily - 7 x weekly - 1 sets - 3 reps - 30 second hold  - Hooklying Single " Knee to Chest Stretch  - 1 x daily - 7 x weekly - 1 sets - 3 reps - 30 second hold  - Standing Hip Flexion with Anchored Resistance and Chair Support  - 1 x daily - 7 x weekly - 2-3 sets - 10 reps  - Standing Hip Extension with Anchored Resistance  - 1 x daily - 7 x weekly - 2-3 sets - 10 reps  - Standing Hip Adduction with Anchored Resistance (Mirrored)  - 1 x daily - 7 x weekly - 2-3 sets - 10 reps  - Standing Hip Abduction with Anchored Resistance  - 1 x daily - 7 x weekly - 2-3 sets - 10 reps

## 2025-04-14 ENCOUNTER — TREATMENT (OUTPATIENT)
Dept: PHYSICAL THERAPY | Facility: CLINIC | Age: 75
End: 2025-04-14
Payer: MEDICARE

## 2025-04-14 DIAGNOSIS — G89.29 CHRONIC RIGHT HIP PAIN: ICD-10-CM

## 2025-04-14 DIAGNOSIS — G89.29 CHRONIC PAIN OF RIGHT KNEE: ICD-10-CM

## 2025-04-14 DIAGNOSIS — M25.551 CHRONIC RIGHT HIP PAIN: ICD-10-CM

## 2025-04-14 DIAGNOSIS — M25.561 CHRONIC PAIN OF RIGHT KNEE: ICD-10-CM

## 2025-04-14 PROCEDURE — 97110 THERAPEUTIC EXERCISES: CPT | Mod: GP,CQ

## 2025-04-14 PROCEDURE — 97112 NEUROMUSCULAR REEDUCATION: CPT | Mod: GP,CQ

## 2025-04-14 NOTE — PROGRESS NOTES
"Physical Therapy  Physical Therapy Progress Note    Patient Name Mani Larios   MRN: [id number]  Today's Date: 4/14/2025  Time Calculation  Start Time: 0800  Stop Time: 0845  Time Calculation (min): 45 min    Insurance:    Visit #:   5 /16  Insurance Reviewed  (per information provided by  pre-cert team)   [facility] MCR/$25 COPAY VISITS ARE MED NEC NEEDS AUTH OPTUM PAYS % OOP 4500.00 165.00 APPLIED   Authorization required:  Y  Approved # of visits: 16 visits   Authorization date range:  2/13-4/24/25    Therapy Diagnoses:   Problem List Items Addressed This Visit             ICD-10-CM    Chronic right hip pain M25.551, G89.29    Chronic pain of right knee M25.561, G89.29     General:  Reason for visit: R hip and R knee pain   Referred by: Adonis Silva MD Next MD appt:  5-6-25  Preferred Name:  Mani  Script:  PT  Onset Date:  6-1-24  Assessment/re-assessment dates: 2-13-25  Email/phone #:  [email or web address]  Access Code: [email or web address]    Precautions:  no fall risk  Type II DM, HTN, high cholesterol, see meds in chart     Subjective:   Patient reports:  that the R knee is feeling good, he is still having some pain on the R iliac crest that can go  into the buttock area or into the R groin at times.  Reports at least 50% improvement since the start of therapy.    Have you fallen since last visit:  no    Pain:  2-3/10  Location/Type of pain:  aching R iliac crest and into buttock.     HEP compliance/understanding:  yes    Objective:   Objective Measurements:    Strength : able to add shuttle at 50# for amber LE's     Treatment:   **= HEP progression today NV= Next visit  np= not performed  nb= non-billable  G= group HEP= discharged to [facility]  Therapeutic Exercise:     30 minutes  Nu-step(subjective taken/education):    Lev 3 x 10'  R hip flexor/calf  and hams stretch:  2/30\" ea.   R IT Band stretch: 2/30\"   R SKTC:  3/30\"**  Supine R piriformis stretch:  2/30\"**  Supine figure 4 stretch:  3/30\"**  Shuttle Leg Press:  " "BLE's 50# 2 x 10  Shuttle Calf Raises:  50# 2 x 10     Neuromuscular Re-education:    10 minutes  Airex R SLS:  x 5/8\" max  Steamboats OKC/CKC\"  Red x 10 ea. **  Alt lunges on BOSU:  10x     Not Performed today:    R T-band TKE:  Green/20/5\"  BOSU Black Side up Partial squats:  x 20(per patient does squats daily at home)  Theraband 3 way pull downs:  Green/ x 15 ea. (Add to HEP as able)  Theraband Obliques:  Green/ x 15 ea. (Add to HEP as able)        Manual Therapy:     5 minutes  IASTM to R HIP /LB in L side         Education:  exercise progression    Assessment:  Patient tolerated treatment well, did well with progression this date.  Positive response to Iastm and shuttle. Reported decreased pain post rx.  Patient needs continued work on/skilled PT for: closed chain strength and flexibility to address remaining functional, objective and subjective deficits to allow them to return to prior /optimal level of function with ADLs.  Patient is progressing with goals: overall decreased pain and HEP compliance.    Skilled care:  exercise and HEP progression.     Plan:    Continue to progress per poc:   NV Resume tband and consider adding to HEP. Continue IASTM as needed     HEP:      Access Code: [email or web address]  URL: [email or web address]  Date: 04/11/2025  Prepared by: Deb Rivera    Program Notes  Do the laying down exercises on the bed, not on the floor    Exercises  - Standing Hamstring Stretch on Chair  - 1-2 x daily - 7 x weekly - 1 sets - 3 reps - 30 second hold  - Hip Flexor Stretch with Chair  - 1-2 x daily - 7 x weekly - 1 sets - 3 reps - 30 second hold  - Single Leg Stance (Mirrored)  - 1 x daily - 7 x weekly - 1 sets - 5-10 reps - up to 30 second hold  - ITB Stretch at Wall (Mirrored)  - 1 x daily - 7 x weekly - 1 sets - 3 reps - 30 second hold  - Standing Terminal Knee Extension with Resistance  - 1 x daily - 7 x weekly - 3 sets - 10 reps - 5 second hold  - Supine Piriformis Stretch " with Foot on Ground  - 1 x daily - 7 x weekly - 1 sets - 3 reps - 30 second hold  - Supine Figure 4 Piriformis Stretch  - 1 x daily - 7 x weekly - 1 sets - 3 reps - 30 second hold  - Hooklying Single Knee to Chest Stretch  - 1 x daily - 7 x weekly - 1 sets - 3 reps - 30 second hold  - Standing Hip Flexion with Anchored Resistance and Chair Support  - 1 x daily - 7 x weekly - 2-3 sets - 10 reps  - Standing Hip Extension with Anchored Resistance  - 1 x daily - 7 x weekly - 2-3 sets - 10 reps  - Standing Hip Adduction with Anchored Resistance (Mirrored)  - 1 x daily - 7 x weekly - 2-3 sets - 10 reps  - Standing Hip Abduction with Anchored Resistance  - 1 x daily - 7 x weekly - 2-3 sets - 10 reps

## 2025-04-17 ENCOUNTER — TREATMENT (OUTPATIENT)
Dept: PHYSICAL THERAPY | Facility: CLINIC | Age: 75
End: 2025-04-17
Payer: MEDICARE

## 2025-04-17 DIAGNOSIS — M25.551 CHRONIC RIGHT HIP PAIN: ICD-10-CM

## 2025-04-17 DIAGNOSIS — M25.561 CHRONIC PAIN OF RIGHT KNEE: ICD-10-CM

## 2025-04-17 DIAGNOSIS — G89.29 CHRONIC RIGHT HIP PAIN: ICD-10-CM

## 2025-04-17 DIAGNOSIS — G89.29 CHRONIC PAIN OF RIGHT KNEE: ICD-10-CM

## 2025-04-17 PROCEDURE — 97112 NEUROMUSCULAR REEDUCATION: CPT | Mod: GP,CQ

## 2025-04-17 PROCEDURE — 97110 THERAPEUTIC EXERCISES: CPT | Mod: GP,CQ

## 2025-04-17 NOTE — PROGRESS NOTES
"Physical Therapy  Physical Therapy Progress Note    Patient Name Mani Larios   MRN: [id number]  Today's Date: 4/17/2025  Time Calculation  Start Time: 0815  Stop Time: 0900  Time Calculation (min): 45 min    Insurance:    Visit #:   6 /16  Insurance Reviewed  (per information provided by  pre-cert team)   [facility] MCR/$25 COPAY VISITS ARE MED NEC NEEDS AUTH [employer] PAYS % OOP 4500.00 165.00 APPLIED   Authorization required:  Y  Approved # of visits: 16 visits   Authorization date range:  2/13-4/24/25    Therapy Diagnoses:   Problem List Items Addressed This Visit           ICD-10-CM    Chronic right hip pain M25.551, G89.29    Chronic pain of right knee M25.561, G89.29     General:  Reason for visit: R hip and R knee pain   Referred by: Adonis Silva MD Next MD appt:  5-6-25  Preferred Name:  Mani  Script:  PT  Onset Date:  6-1-24  Assessment/re-assessment dates: 2-13-25  Email/phone #:  [email or web address]  Access Code: [email or web address]    Precautions:  no fall risk  Type II DM, HTN, high cholesterol, see meds in chart     Subjective:   Patient reports:  that he is still having some pain on the R iliac crest , does not really move forward or back anymore. Does not think IASTM did much ,felt good at the time but not long lasting.    Have you fallen since last visit:  no    Pain:  2-3/10  Location/Type of pain:  aching R iliac crest     HEP compliance/understanding:  yes    Objective:   Objective Measurements:    Balance RLE airex  14 sec    Treatment:   **= HEP progression today NV= Next visit  np= not performed  nb= non-billable  G= group HEP= discharged to HEP  Therapeutic Exercise:     27 minutes  Nu-step(subjective taken/education):    Lev 3 x 10'  R hip flexor/calf  and hams stretch:  2/30\" ea.   R IT Band stretch: 2/30\"   R SKTC:  3/30\"**  Supine R piriformis stretch:  2/30\"**  Supine figure 4 stretch:  3/30\"**  Supine hip flexor stretch 30 sec 2x   Shuttle Leg Press:  BLE's 50# 2 x 10  Shuttle Calf Raises:  " "50# 2 x 10     Neuromuscular Re-education:    18 minutes  Airex R SLS:  x 5/14\" max  Steamboats OKC/CKC\"  Red x 15 ea. **  Alt lunges on BOSU:  10x   Theraband 3 way pull downs:  Green/ x 15 ea. **  Theraband Obliques:  Green/ x 15 ea. **  Not Performed today:    R T-band TKE:  Green/20/5\"  BOSU Black Side up Partial squats:  x 20(per patient does squats daily at home)          Manual Therapy:       Minutes not performed  IASTM to R HIP /LB in L side         Education:  exercise progression    Assessment:  Patient tolerated treatment well, did well with progression this date.   Patient needs continued work on/skilled PT for: closed chain strength and flexibility to address remaining functional, objective and subjective deficits to allow them to return to prior /optimal level of function with ADLs.  Patient is progressing with goals: overall decreased pain/ balance /HEP compliance.    Skilled care:  exercise and HEP progression.     Plan:    Continue to progress per poc:   NV  Continue IASTM as needed   Progress  steamboat tband as able    HEP:      Access Code: [email or web address]  URL: [email or web address]  Date: 04/17/2025  Prepared by: Maryam    Program Notes  Do the laying down exercises on the bed, not on the floor    Exercises  - Standing Hamstring Stretch on Chair  - 1-2 x daily - 7 x weekly - 1 sets - 3 reps - 30 second hold  - Hip Flexor Stretch with Chair  - 1-2 x daily - 7 x weekly - 1 sets - 3 reps - 30 second hold  - Single Leg Stance (Mirrored)  - 1 x daily - 7 x weekly - 1 sets - 5-10 reps - up to 30 second hold  - ITB Stretch at Wall (Mirrored)  - 1 x daily - 7 x weekly - 1 sets - 3 reps - 30 second hold  - Standing Terminal Knee Extension with Resistance  - 1 x daily - 7 x weekly - 3 sets - 10 reps - 5 second hold  - Supine Piriformis Stretch with Foot on Ground  - 1 x daily - 7 x weekly - 1 sets - 3 reps - 30 second hold  - Supine Figure 4 Piriformis Stretch  - 1 x daily - 7 x weekly - 1 " sets - 3 reps - 30 second hold  - Hooklying Single Knee to Chest Stretch  - 1 x daily - 7 x weekly - 1 sets - 3 reps - 30 second hold  - Standing Hip Flexion with Anchored Resistance and Chair Support  - 1 x daily - 7 x weekly - 2-3 sets - 10 reps  - Standing Hip Extension with Anchored Resistance  - 1 x daily - 7 x weekly - 2-3 sets - 10 reps  - Standing Hip Adduction with Anchored Resistance (Mirrored)  - 1 x daily - 7 x weekly - 2-3 sets - 10 reps  - Standing Hip Abduction with Anchored Resistance  - 1 x daily - 7 x weekly - 2-3 sets - 10 reps  - Modified Adonis Stretch  - 1 x daily - 7 x weekly - 1 sets - 3 reps - 30 hold

## 2025-04-22 ENCOUNTER — TREATMENT (OUTPATIENT)
Dept: PHYSICAL THERAPY | Facility: CLINIC | Age: 75
End: 2025-04-22
Payer: MEDICARE

## 2025-04-22 DIAGNOSIS — G89.29 CHRONIC RIGHT HIP PAIN: ICD-10-CM

## 2025-04-22 DIAGNOSIS — M25.551 CHRONIC RIGHT HIP PAIN: ICD-10-CM

## 2025-04-22 DIAGNOSIS — M25.561 CHRONIC PAIN OF RIGHT KNEE: ICD-10-CM

## 2025-04-22 DIAGNOSIS — G89.29 CHRONIC PAIN OF RIGHT KNEE: ICD-10-CM

## 2025-04-22 PROCEDURE — 97112 NEUROMUSCULAR REEDUCATION: CPT | Mod: GP | Performed by: PHYSICAL THERAPIST

## 2025-04-22 PROCEDURE — 97110 THERAPEUTIC EXERCISES: CPT | Mod: GP | Performed by: PHYSICAL THERAPIST

## 2025-04-22 PROCEDURE — 97140 MANUAL THERAPY 1/> REGIONS: CPT | Mod: GP | Performed by: PHYSICAL THERAPIST

## 2025-04-22 PROCEDURE — 97014 ELECTRIC STIMULATION THERAPY: CPT | Mod: GP | Performed by: PHYSICAL THERAPIST

## 2025-04-22 NOTE — PROGRESS NOTES
"Physical Therapy  Physical Therapy Progress Note    Patient Name Mani Larios   MRN: [id number]  Today's Date: 4/22/2025  Time Calculation  Start Time: 0815  Stop Time: 0915  Time Calculation (min): 60 min    Insurance:    Visit #:   7 /16  Insurance Reviewed  (per information provided by  pre-cert team)   [facility] MCR/$25 COPAY VISITS ARE MED NEC NEEDS AUTH [employer] PAYS % OOP 4500.00 165.00 APPLIED   Authorization required:  Y  Approved # of visits: 16 visits   Authorization date range:  2/13-4/24/25    Therapy Diagnoses:   Problem List Items Addressed This Visit           ICD-10-CM    Chronic right hip pain M25.551, G89.29    Chronic pain of right knee M25.561, G89.29     General:  Reason for visit: R hip and R knee pain   Referred by: Adonis Silva MD Next MD appt:  5-6-25  Preferred Name:  Mani  Script:  PT  Onset Date:  6-1-24  Assessment/re-assessment dates: 2-13-25  Email/phone #:  [email or web address]  Access Code: [email or web address]    Precautions:  no fall risk  Type II DM, HTN, high cholesterol, see meds in chart      Subjective:   Patient reports:  that the R knee is feeling good.  The R Iliac crest pain is less, but still present    Have you fallen since last visit:  no    Pain:  2/10  Location/Type of pain:  R iliac crest    HEP compliance/understanding:  yes    Objective:   Objective Measurements:    Function:   patient has been doing a lot of yard work and bending over a lot.      Treatment:   **= HEP progression today NV= Next visit  np= not performed  nb= non-billable  G= group HEP= discharged to HEP  Therapeutic Exercise:     15 minutes  Nu-step(subjective taken/education):    Lev 3 x 10'  R hip flexor/calf  and hams stretch:  2/30\" ea.   R IT band stretch:  3/30\"   Not Performed today:    R SKTC:  3/30\"**  Supine R piriformis stretch:  2/30\"**  Supine figure 4 stretch:  3/30\"**  Supine hip flexor stretch 30 sec 2x   Shuttle Leg Press:  BLE's 50# 2 x 10  Shuttle Calf Raises:  50# 2 x 10     Manual " "Therapy:     13 minutes  IASTM to R iliac crest/L side lying with pillow between knees    Neuromuscular Re-education:    10 minutes  Steamboats OKC/CKC:  red x 5 ea. Cues for form and posture.   T-band B pull for/back:  Green x 20 ea.**  Not Performed today:    R T-band TKE:  Green/20/5\"  BOSU Black Side up Partial squats:  x 20(per patient does squats daily at home)  Airex R SLS:  x 5/14\" max  Alt lunges on BOSU:  10x   Theraband 3 way pull downs:  Green/ x 15 ea. **  Theraband Obliques:  Green/ x 15 ea. **    Modalities:       Electrical Stimulation        15 minutes  IFC to R lateral iliac crest//40%/Intensity:  sensory stim/patient given controller/L side lying with pillow between knees    Education:  exercise and HEP progression/cues for form    Assessment:  Patient tolerated treatment well, did well with progression this date.    Patient needs continued work on/skilled PT for: core and LE strength and stability to address remaining functional, objective and subjective deficits to allow them to return to prior /optimal level of function with ADLs.  Patient is progressing with goals: decreased pain level.   Skilled care:  exercise and HEP progression/manual/e-stim    Plan:    Continue to progress per poc:   NV monitor response to e-stim and continue to work on strength and stability    HEP:      Access Code: [email or web address]  URL: [email or web address]  Date: 04/22/2025  Prepared by: Deb Rivera    Program Notes  Do the laying down exercises on the bed, not on the floor    Exercises  - Standing Hamstring Stretch on Chair  - 1-2 x daily - 7 x weekly - 1 sets - 3 reps - 30 second hold  - Hip Flexor Stretch with Chair  - 1-2 x daily - 7 x weekly - 1 sets - 3 reps - 30 second hold  - Single Leg Stance (Mirrored)  - 1 x daily - 7 x weekly - 1 sets - 5-10 reps - up to 30 second hold  - ITB Stretch at Wall (Mirrored)  - 1 x daily - 7 x weekly - 1 sets - 3 reps - 30 second hold  - Standing " Terminal Knee Extension with Resistance  - 1 x daily - 7 x weekly - 3 sets - 10 reps - 5 second hold  - Supine Piriformis Stretch with Foot on Ground  - 1 x daily - 7 x weekly - 1 sets - 3 reps - 30 second hold  - Supine Figure 4 Piriformis Stretch  - 1 x daily - 7 x weekly - 1 sets - 3 reps - 30 second hold  - Hooklying Single Knee to Chest Stretch  - 1 x daily - 7 x weekly - 1 sets - 3 reps - 30 second hold  - Standing Hip Flexion with Anchored Resistance and Chair Support  - 1 x daily - 7 x weekly - 2-3 sets - 10 reps  - Standing Hip Extension with Anchored Resistance  - 1 x daily - 7 x weekly - 2-3 sets - 10 reps  - Standing Hip Adduction with Anchored Resistance (Mirrored)  - 1 x daily - 7 x weekly - 2-3 sets - 10 reps  - Standing Hip Abduction with Anchored Resistance  - 1 x daily - 7 x weekly - 2-3 sets - 10 reps  - Modified Adonis Stretch  - 1 x daily - 7 x weekly - 1 sets - 3 reps - 30 hold  - Shoulder extension with resistance - Neutral  - 1 x daily - 7 x weekly - 2-3 sets - 10 reps  - Standing B shoulder pull down to opposite hip  - 1 x daily - 7 x weekly - 2-3 sets - 10 reps  - Theraband oblique pull  - 1 x daily - 7 x weekly - 2-3 sets - 10 reps  - Shoulder Extension with Resistance Hands Down  - 1 x daily - 7 x weekly - 3 sets - 10 reps  - Standing Shoulder Flexion with Posterior Anchored Resistance  - 1 x daily - 7 x weekly - 3 sets - 10 reps

## 2025-04-24 ENCOUNTER — TREATMENT (OUTPATIENT)
Dept: PHYSICAL THERAPY | Facility: CLINIC | Age: 75
End: 2025-04-24
Payer: MEDICARE

## 2025-04-24 DIAGNOSIS — G89.29 CHRONIC PAIN OF RIGHT KNEE: ICD-10-CM

## 2025-04-24 DIAGNOSIS — M25.561 CHRONIC PAIN OF RIGHT KNEE: ICD-10-CM

## 2025-04-24 DIAGNOSIS — G89.29 CHRONIC RIGHT HIP PAIN: ICD-10-CM

## 2025-04-24 DIAGNOSIS — M25.551 CHRONIC RIGHT HIP PAIN: ICD-10-CM

## 2025-04-24 PROCEDURE — 97110 THERAPEUTIC EXERCISES: CPT | Mod: GP | Performed by: PHYSICAL THERAPIST

## 2025-04-24 NOTE — PROGRESS NOTES
Physical Therapy  Physical Therapy Progress Note/re-assessment/discharge summary    Patient Name Mani Larios   MRN: [id number]  Today's Date: 4/24/2025  Time Calculation  Start Time: 0815  Stop Time: 0855  Time Calculation (min): 40 min    Insurance:    Visit #:   8 /16  Insurance Reviewed  (per information provided by  pre-cert team)   [facility] MCR/$25 COPAY VISITS ARE MED NEC NEEDS AUTH [employer] PAYS % OOP 4500.00 165.00 APPLIED   Authorization required:  Y  Approved # of visits: 16 visits   Authorization date range:  2/13-4/24/25    Therapy Diagnoses:   Problem List Items Addressed This Visit           ICD-10-CM    Chronic right hip pain M25.551, G89.29    Chronic pain of right knee M25.561, G89.29     General:  Reason for visit: R hip and R knee pain   Referred by: Adonis Silva MD Next MD appt:  5-6-25  Preferred Name:  Mani  Script:  PT  Onset Date:  6-1-24  Assessment/re-assessment dates: 2-13-25/4-24-25  Email/phone #:  [email or web address]  Access Code: [email or web address]    Precautions:   no fall risk  Type II DM, HTN, high cholesterol, see meds in chart     Subjective:   Patient reports:  that he is feeling better overall.  He is a little muscle sore from moving bags of mulch yesterday.  He had a little anterior hip soreness after last session.      Have you fallen since last visit:  no    Pain:  2/10  Location/Type of pain:  R iliac crest.    HEP compliance/understanding:  yes    Objective:   Objective Measurements:    Sleep: usually no longer wakes from pain at times,  instructed in proper postures.  ADL's:  WNL (was painful with dressing LE's in hip and knee. )  Chores:  able to move mulch yesterday and is just having lower back muscle soreness (was paces with yard work and painful, self propelled push mower, .  )  Driving:  WNL (was painful with driving and transfers.)   Work: retired  Recreation: has resumed walking program/currently half the time/distance as before/currently 15'/wants to work  "up to 30(was wants to resume walking outdoors for fitness. )     Objective:    Outcome Measures:  Other Measures  Lower Extremity Funtional Score (LEFS): 57/11%(was 29/64: 55%) limitation of function     Posture:  mod for head and rounded shoulders.      Gait/stairs:  decreased stance time, decreased hip ext and trunk rotation, reciprocal pattern on stairs without UE support with LE WB.    4-24-25:  symmetrical WB, improved hip extension and trunk rotation and good pattern on stairs.       Balance:  R SLS:  15\"(was 6\")  L SLS:  20\"     ROM:  R knee ext/flex:  A: WNL and R hip AROM WNL throughout except:   R hip extension:  0/14(was 0/12)     MMT:   Hip flexors: 5/5(was 4+/5) B  ext: 4+/5(was 3-/5 B)  abd: 5/5(was 4-/5)  add: 5/5(was 4/5)  Quads: 5/5(was 4+/5) B  Hams: 5/5(was 4+/5)  DF: 5/5 B  PF: (NWB):  5/5 B  Hip IR:  5/5(was 4+/5) B  Hip ER:  5/5(was 4+/5) B     Flexibility:    Hams:  90/90: -18(was -35) B  Heelcords: 10(was 8) B  Hip flexors:  trace (was mod) B  IT band: WNL (was min) B      Palpation: - pop    Treatment:   **= HEP progression today NV= Next visit  np= not performed  nb= non-billable  G= group HEP= discharged to HEP  Therapeutic Exercise:     40 minutes  Nu-step(subjective taken/education): Lev 3 x 10'   Re-assessment    Education:  discharge plan    Assessment:  Skilled care:  re-assessment  STG:  In two weeks.   -Increased postural awareness.  Met  -Compliant with HEP.  Met  LTG: by discharge  -Increased postural awareness and posture WFL. Met  -Decreased R hip and knee pain to 2/10 and patient I with self-management of symptoms. Grossly Met  -Increased function with ADL's and IADL's.  Improve LEFS to:  20 %  limitation of function. Met  -Normal gait pattern  on level and uneven surfaces community level distances for improved function in the community.  Met  -Increase  R SLS to 20\"  Partially Met  -Increase R hip AROM to WFL for improved function with normal hip extension at terminal stance " phase of gait. Met  -Increase R LE strength to WNL for improved function with chores and able to resume walking program. Met  -Increase R LE flexibility to WFL.  Met  -I and compliant with HEP and proper: R LE care.  Met    Plan:    Continue with HEP.  Discharge from PT.     HEP:      Access Code: [email or web address]  URL: [email or web address]  Date: 04/22/2025  Prepared by: Deb Rivera     Program Notes  Do the laying down exercises on the bed, not on the floor     Exercises  - Standing Hamstring Stretch on Chair  - 1-2 x daily - 7 x weekly - 1 sets - 3 reps - 30 second hold  - Hip Flexor Stretch with Chair  - 1-2 x daily - 7 x weekly - 1 sets - 3 reps - 30 second hold  - Single Leg Stance (Mirrored)  - 1 x daily - 7 x weekly - 1 sets - 5-10 reps - up to 30 second hold  - ITB Stretch at Wall (Mirrored)  - 1 x daily - 7 x weekly - 1 sets - 3 reps - 30 second hold  - Standing Terminal Knee Extension with Resistance  - 1 x daily - 7 x weekly - 3 sets - 10 reps - 5 second hold  - Supine Piriformis Stretch with Foot on Ground  - 1 x daily - 7 x weekly - 1 sets - 3 reps - 30 second hold  - Supine Figure 4 Piriformis Stretch  - 1 x daily - 7 x weekly - 1 sets - 3 reps - 30 second hold  - Hooklying Single Knee to Chest Stretch  - 1 x daily - 7 x weekly - 1 sets - 3 reps - 30 second hold  - Standing Hip Flexion with Anchored Resistance and Chair Support  - 1 x daily - 7 x weekly - 2-3 sets - 10 reps  - Standing Hip Extension with Anchored Resistance  - 1 x daily - 7 x weekly - 2-3 sets - 10 reps  - Standing Hip Adduction with Anchored Resistance (Mirrored)  - 1 x daily - 7 x weekly - 2-3 sets - 10 reps  - Standing Hip Abduction with Anchored Resistance  - 1 x daily - 7 x weekly - 2-3 sets - 10 reps  - Modified Adonis Stretch  - 1 x daily - 7 x weekly - 1 sets - 3 reps - 30 hold  - Shoulder extension with resistance - Neutral  - 1 x daily - 7 x weekly - 2-3 sets - 10 reps  - Standing B shoulder pull down to  opposite hip  - 1 x daily - 7 x weekly - 2-3 sets - 10 reps  - Theraband oblique pull  - 1 x daily - 7 x weekly - 2-3 sets - 10 reps  - Shoulder Extension with Resistance Hands Down  - 1 x daily - 7 x weekly - 3 sets - 10 reps  - Standing Shoulder Flexion with Posterior Anchored Resistance  - 1 x daily - 7 x weekly - 3 sets - 10 reps

## 2025-05-06 ENCOUNTER — APPOINTMENT (OUTPATIENT)
Dept: PRIMARY CARE | Facility: CLINIC | Age: 75
End: 2025-05-06
Payer: MEDICARE

## 2025-05-06 VITALS
DIASTOLIC BLOOD PRESSURE: 87 MMHG | HEIGHT: 66 IN | HEART RATE: 84 BPM | SYSTOLIC BLOOD PRESSURE: 148 MMHG | OXYGEN SATURATION: 97 % | WEIGHT: 137 LBS | BODY MASS INDEX: 22.02 KG/M2

## 2025-05-06 DIAGNOSIS — G89.29 CHRONIC RIGHT HIP PAIN: ICD-10-CM

## 2025-05-06 DIAGNOSIS — Z12.5 SCREENING FOR PROSTATE CANCER: ICD-10-CM

## 2025-05-06 DIAGNOSIS — M25.561 CHRONIC PAIN OF RIGHT KNEE: ICD-10-CM

## 2025-05-06 DIAGNOSIS — M25.551 CHRONIC RIGHT HIP PAIN: ICD-10-CM

## 2025-05-06 DIAGNOSIS — E78.2 MIXED HYPERLIPIDEMIA: Chronic | ICD-10-CM

## 2025-05-06 DIAGNOSIS — I10 PRIMARY HYPERTENSION: Primary | Chronic | ICD-10-CM

## 2025-05-06 DIAGNOSIS — G89.29 CHRONIC PAIN OF RIGHT KNEE: ICD-10-CM

## 2025-05-06 PROBLEM — R51.9 NEW ONSET OF HEADACHES AFTER AGE 50: Status: RESOLVED | Noted: 2024-12-05 | Resolved: 2025-05-06

## 2025-05-06 PROCEDURE — 3079F DIAST BP 80-89 MM HG: CPT | Performed by: INTERNAL MEDICINE

## 2025-05-06 PROCEDURE — 1036F TOBACCO NON-USER: CPT | Performed by: INTERNAL MEDICINE

## 2025-05-06 PROCEDURE — G2211 COMPLEX E/M VISIT ADD ON: HCPCS | Performed by: INTERNAL MEDICINE

## 2025-05-06 PROCEDURE — 3008F BODY MASS INDEX DOCD: CPT | Performed by: INTERNAL MEDICINE

## 2025-05-06 PROCEDURE — 3048F LDL-C <100 MG/DL: CPT | Performed by: INTERNAL MEDICINE

## 2025-05-06 PROCEDURE — 1159F MED LIST DOCD IN RCRD: CPT | Performed by: INTERNAL MEDICINE

## 2025-05-06 PROCEDURE — 3062F POS MACROALBUMINURIA REV: CPT | Performed by: INTERNAL MEDICINE

## 2025-05-06 PROCEDURE — 1170F FXNL STATUS ASSESSED: CPT | Performed by: INTERNAL MEDICINE

## 2025-05-06 PROCEDURE — 99213 OFFICE O/P EST LOW 20 MIN: CPT | Performed by: INTERNAL MEDICINE

## 2025-05-06 PROCEDURE — 4010F ACE/ARB THERAPY RXD/TAKEN: CPT | Performed by: INTERNAL MEDICINE

## 2025-05-06 PROCEDURE — 3044F HG A1C LEVEL LT 7.0%: CPT | Performed by: INTERNAL MEDICINE

## 2025-05-06 PROCEDURE — G0439 PPPS, SUBSEQ VISIT: HCPCS | Performed by: INTERNAL MEDICINE

## 2025-05-06 PROCEDURE — 3077F SYST BP >= 140 MM HG: CPT | Performed by: INTERNAL MEDICINE

## 2025-05-06 RX ORDER — OLMESARTAN MEDOXOMIL 40 MG/1
40 TABLET ORAL DAILY
Qty: 90 TABLET | Refills: 3 | Status: SHIPPED | OUTPATIENT
Start: 2025-05-06 | End: 2026-05-06

## 2025-05-06 RX ORDER — ROSUVASTATIN CALCIUM 40 MG/1
40 TABLET, COATED ORAL DAILY
Qty: 90 TABLET | Refills: 3 | Status: SHIPPED | OUTPATIENT
Start: 2025-05-06

## 2025-05-06 ASSESSMENT — ACTIVITIES OF DAILY LIVING (ADL)
GROCERY_SHOPPING: INDEPENDENT
DRESSING: INDEPENDENT
BATHING: INDEPENDENT
MANAGING_FINANCES: INDEPENDENT
DOING_HOUSEWORK: INDEPENDENT
TAKING_MEDICATION: INDEPENDENT

## 2025-05-06 NOTE — PROGRESS NOTES
"Subjective   Patient ID: Mani Larios is a 75 y.o. male who presents for Follow-up and Medicare Annual Wellness Visit Subsequent.    HPI   R hip and R knee pain much better after PT. He is continuing these exercises at home.    BP this /70, then a few hours later 111/69.  Pt brought his cuff today and it matched ours.  Tolerating olmesartan    Has not been taking zetia for 10 months or so.  Tolerating statin.      Review of Systems    Objective   /87 (BP Location: Right arm, Patient Position: Sitting)   Pulse 84   Ht 1.676 m (5' 6\")   Wt 62.1 kg (137 lb)   SpO2 97%   BMI 22.11 kg/m²     Physical Exam  Constitutional:       Appearance: Normal appearance.   Cardiovascular:      Rate and Rhythm: Normal rate and regular rhythm.      Heart sounds: No murmur heard.  Pulmonary:      Effort: Pulmonary effort is normal.      Breath sounds: Normal breath sounds.   Musculoskeletal:      Right lower leg: No edema.      Left lower leg: No edema.   Neurological:      General: No focal deficit present.      Mental Status: He is alert.      Gait: Gait normal.         Assessment/Plan   Problem List Items Addressed This Visit           ICD-10-CM    Hyperlipidemia (Chronic) E78.5    Relevant Medications    rosuvastatin (Crestor) 40 mg tablet    Hypertension - Primary (Chronic) I10    Relevant Medications    olmesartan (BENIcar) 40 mg tablet    Other Relevant Orders    Basic Metabolic Panel    Chronic right hip pain M25.551, G89.29    Chronic pain of right knee M25.561, G89.29     Other Visit Diagnoses         Codes      Screening for prostate cancer     Z12.5    Relevant Orders    Prostate Specific Antigen, Screen               Diabetes   -Seeing Dr Celina Bowen  -Optiftikhar - UTD     HTN - controlled at home. Pt brought his cuff today and it matched ours. Continue same med.     Low testosterone  -Previously following with Endo but not being treated, will monitor     HLP  -Continue statin     Raynauds  -Monitor off meds   "   Chronic allergic rhinitis  -Do flonase daily with zyrtec daily     CAC score 588 in 2020  -Continue statin  -Seeing Cardiology     LUTS, microscopic hematuria - Saw Urology. Neg hematuria workup.     R hip OA, R knee OA - better with PT, continue home exercises.     Health maintenance  -Had colonoscopy 8/2021, due in 10 years  -PSA nml 3/2024  -Immunizations    -Pneumonia - UTD with both     f/u 6 months

## 2025-05-07 LAB
ANION GAP SERPL CALCULATED.4IONS-SCNC: 10 MMOL/L (CALC) (ref 7–17)
BUN SERPL-MCNC: 23 MG/DL (ref 7–25)
BUN/CREAT SERPL: NORMAL (CALC) (ref 6–22)
CALCIUM SERPL-MCNC: 9.7 MG/DL (ref 8.6–10.3)
CHLORIDE SERPL-SCNC: 101 MMOL/L (ref 98–110)
CO2 SERPL-SCNC: 26 MMOL/L (ref 20–32)
CREAT SERPL-MCNC: 1.2 MG/DL (ref 0.7–1.28)
EGFRCR SERPLBLD CKD-EPI 2021: 64 ML/MIN/1.73M2
GLUCOSE SERPL-MCNC: 88 MG/DL (ref 65–99)
POTASSIUM SERPL-SCNC: 4.2 MMOL/L (ref 3.5–5.3)
PSA SERPL-MCNC: 4.88 NG/ML
SODIUM SERPL-SCNC: 137 MMOL/L (ref 135–146)

## 2025-05-08 DIAGNOSIS — R97.20 ELEVATED PSA: Primary | ICD-10-CM

## 2025-05-09 ENCOUNTER — PATIENT MESSAGE (OUTPATIENT)
Dept: PRIMARY CARE | Facility: CLINIC | Age: 75
End: 2025-05-09
Payer: MEDICARE

## 2025-05-09 ENCOUNTER — TELEPHONE (OUTPATIENT)
Dept: PRIMARY CARE | Facility: CLINIC | Age: 75
End: 2025-05-09
Payer: MEDICARE

## 2025-05-09 NOTE — TELEPHONE ENCOUNTER
----- Message from Adonis Silva sent at 5/8/2025 12:51 PM EDT -----  PSA level slightly high. First step is to just recheck. I placed an order to recheck this to be done in a few weeks.  ----- Message -----  From: [facility] Results In  Sent: 5/7/2025   7:04 AM EDT  To: Adonis Silva MD